# Patient Record
Sex: MALE | Race: WHITE | Employment: OTHER | URBAN - METROPOLITAN AREA
[De-identification: names, ages, dates, MRNs, and addresses within clinical notes are randomized per-mention and may not be internally consistent; named-entity substitution may affect disease eponyms.]

---

## 2018-10-29 PROBLEM — K40.90 RIGHT INGUINAL HERNIA: Status: ACTIVE | Noted: 2018-10-29

## 2018-12-07 RX ORDER — SODIUM CHLORIDE 0.9 % (FLUSH) 0.9 %
5-10 SYRINGE (ML) INJECTION EVERY 8 HOURS
Status: CANCELLED | OUTPATIENT
Start: 2018-12-07

## 2018-12-07 RX ORDER — SODIUM CHLORIDE 0.9 % (FLUSH) 0.9 %
5-10 SYRINGE (ML) INJECTION AS NEEDED
Status: CANCELLED | OUTPATIENT
Start: 2018-12-07

## 2019-01-07 RX ORDER — SODIUM CHLORIDE 0.9 % (FLUSH) 0.9 %
5-40 SYRINGE (ML) INJECTION EVERY 8 HOURS
Status: CANCELLED | OUTPATIENT
Start: 2019-01-07

## 2019-01-07 RX ORDER — SODIUM CHLORIDE 0.9 % (FLUSH) 0.9 %
5-40 SYRINGE (ML) INJECTION AS NEEDED
Status: CANCELLED | OUTPATIENT
Start: 2019-01-07

## 2019-01-21 ENCOUNTER — ANESTHESIA EVENT (OUTPATIENT)
Dept: SURGERY | Age: 69
End: 2019-01-21
Payer: COMMERCIAL

## 2019-01-22 ENCOUNTER — ANESTHESIA (OUTPATIENT)
Dept: SURGERY | Age: 69
End: 2019-01-22
Payer: COMMERCIAL

## 2019-01-22 ENCOUNTER — HOSPITAL ENCOUNTER (OUTPATIENT)
Age: 69
Setting detail: OUTPATIENT SURGERY
Discharge: HOME | End: 2019-01-22
Attending: SURGERY | Admitting: SURGERY
Payer: COMMERCIAL

## 2019-01-22 VITALS
RESPIRATION RATE: 12 BRPM | BODY MASS INDEX: 25.86 KG/M2 | HEART RATE: 80 BPM | TEMPERATURE: 97.9 F | HEIGHT: 70 IN | DIASTOLIC BLOOD PRESSURE: 73 MMHG | SYSTOLIC BLOOD PRESSURE: 118 MMHG | WEIGHT: 180.6 LBS | OXYGEN SATURATION: 92 %

## 2019-01-22 DIAGNOSIS — K40.90 RIGHT INGUINAL HERNIA: Primary | ICD-10-CM

## 2019-01-22 LAB — HGB BLD-MCNC: 13.5 G/DL (ref 13.6–17.2)

## 2019-01-22 PROCEDURE — 77030037088 HC TUBE ENDOTRACH ORAL NSL COVD-A: Performed by: ANESTHESIOLOGY

## 2019-01-22 PROCEDURE — 77030018673: Performed by: SURGERY

## 2019-01-22 PROCEDURE — 77030031139 HC SUT VCRL2 J&J -A: Performed by: SURGERY

## 2019-01-22 PROCEDURE — 74011250636 HC RX REV CODE- 250/636: Performed by: SURGERY

## 2019-01-22 PROCEDURE — 74011000250 HC RX REV CODE- 250

## 2019-01-22 PROCEDURE — 77030039425 HC BLD LARYNG TRULITE DISP TELE -A: Performed by: ANESTHESIOLOGY

## 2019-01-22 PROCEDURE — 76060000034 HC ANESTHESIA 1.5 TO 2 HR: Performed by: SURGERY

## 2019-01-22 PROCEDURE — 74011000272 HC RX REV CODE- 272: Performed by: SURGERY

## 2019-01-22 PROCEDURE — 77030003028 HC SUT VCRL J&J -A: Performed by: SURGERY

## 2019-01-22 PROCEDURE — 77030020782 HC GWN BAIR PAWS FLX 3M -B: Performed by: ANESTHESIOLOGY

## 2019-01-22 PROCEDURE — 74011250637 HC RX REV CODE- 250/637: Performed by: ANESTHESIOLOGY

## 2019-01-22 PROCEDURE — 77030018836 HC SOL IRR NACL ICUM -A: Performed by: SURGERY

## 2019-01-22 PROCEDURE — 76210000006 HC OR PH I REC 0.5 TO 1 HR: Performed by: SURGERY

## 2019-01-22 PROCEDURE — 74011250636 HC RX REV CODE- 250/636

## 2019-01-22 PROCEDURE — 85018 HEMOGLOBIN: CPT

## 2019-01-22 PROCEDURE — 74011250636 HC RX REV CODE- 250/636: Performed by: ANESTHESIOLOGY

## 2019-01-22 PROCEDURE — 77030039266 HC ADH SKN EXOFIN S2SG -A: Performed by: SURGERY

## 2019-01-22 PROCEDURE — 77030032490 HC SLV COMPR SCD KNE COVD -B: Performed by: SURGERY

## 2019-01-22 PROCEDURE — 76010000162 HC OR TIME 1.5 TO 2 HR INTENSV-TIER 1: Performed by: SURGERY

## 2019-01-22 PROCEDURE — 77030002986 HC SUT PROL J&J -A: Performed by: SURGERY

## 2019-01-22 PROCEDURE — 76210000020 HC REC RM PH II FIRST 0.5 HR: Performed by: SURGERY

## 2019-01-22 PROCEDURE — 74011000250 HC RX REV CODE- 250: Performed by: SURGERY

## 2019-01-22 PROCEDURE — 77030012411 HC DRN WND CARD -A: Performed by: SURGERY

## 2019-01-22 PROCEDURE — C1781 MESH (IMPLANTABLE): HCPCS | Performed by: SURGERY

## 2019-01-22 DEVICE — SELF-GRIPPING POLYESTER MESH,RIGHT ANATOMICAL, POLYESTER WITH POLYLACTIC ACID GRIPS
Type: IMPLANTABLE DEVICE | Site: INGUINAL | Status: FUNCTIONAL
Brand: PROGRIP

## 2019-01-22 RX ORDER — ROCURONIUM BROMIDE 10 MG/ML
INJECTION, SOLUTION INTRAVENOUS AS NEEDED
Status: DISCONTINUED | OUTPATIENT
Start: 2019-01-22 | End: 2019-01-22 | Stop reason: HOSPADM

## 2019-01-22 RX ORDER — PROPOFOL 10 MG/ML
INJECTION, EMULSION INTRAVENOUS AS NEEDED
Status: DISCONTINUED | OUTPATIENT
Start: 2019-01-22 | End: 2019-01-22 | Stop reason: HOSPADM

## 2019-01-22 RX ORDER — HYDROMORPHONE HYDROCHLORIDE 2 MG/ML
0.5 INJECTION, SOLUTION INTRAMUSCULAR; INTRAVENOUS; SUBCUTANEOUS
Status: DISCONTINUED | OUTPATIENT
Start: 2019-01-22 | End: 2019-01-22 | Stop reason: HOSPADM

## 2019-01-22 RX ORDER — LIDOCAINE HYDROCHLORIDE 10 MG/ML
0.1 INJECTION INFILTRATION; PERINEURAL AS NEEDED
Status: DISCONTINUED | OUTPATIENT
Start: 2019-01-22 | End: 2019-01-22 | Stop reason: HOSPADM

## 2019-01-22 RX ORDER — OXYCODONE HYDROCHLORIDE 5 MG/1
10 TABLET ORAL
Status: DISCONTINUED | OUTPATIENT
Start: 2019-01-22 | End: 2019-01-22 | Stop reason: HOSPADM

## 2019-01-22 RX ORDER — DEXAMETHASONE SODIUM PHOSPHATE 4 MG/ML
INJECTION, SOLUTION INTRA-ARTICULAR; INTRALESIONAL; INTRAMUSCULAR; INTRAVENOUS; SOFT TISSUE AS NEEDED
Status: DISCONTINUED | OUTPATIENT
Start: 2019-01-22 | End: 2019-01-22 | Stop reason: HOSPADM

## 2019-01-22 RX ORDER — HYDROCODONE BITARTRATE AND ACETAMINOPHEN 5; 325 MG/1; MG/1
1-2 TABLET ORAL
Qty: 40 TAB | Refills: 0 | Status: SHIPPED | OUTPATIENT
Start: 2019-01-22 | End: 2020-02-27 | Stop reason: CLARIF

## 2019-01-22 RX ORDER — EPHEDRINE SULFATE 50 MG/ML
INJECTION, SOLUTION INTRAVENOUS AS NEEDED
Status: DISCONTINUED | OUTPATIENT
Start: 2019-01-22 | End: 2019-01-22 | Stop reason: HOSPADM

## 2019-01-22 RX ORDER — BUPIVACAINE HYDROCHLORIDE AND EPINEPHRINE 5; 5 MG/ML; UG/ML
INJECTION, SOLUTION EPIDURAL; INTRACAUDAL; PERINEURAL AS NEEDED
Status: DISCONTINUED | OUTPATIENT
Start: 2019-01-22 | End: 2019-01-22 | Stop reason: HOSPADM

## 2019-01-22 RX ORDER — NALOXONE HYDROCHLORIDE 0.4 MG/ML
0.1 INJECTION, SOLUTION INTRAMUSCULAR; INTRAVENOUS; SUBCUTANEOUS AS NEEDED
Status: DISCONTINUED | OUTPATIENT
Start: 2019-01-22 | End: 2019-01-22 | Stop reason: HOSPADM

## 2019-01-22 RX ORDER — MIDAZOLAM HYDROCHLORIDE 1 MG/ML
2 INJECTION, SOLUTION INTRAMUSCULAR; INTRAVENOUS
Status: DISCONTINUED | OUTPATIENT
Start: 2019-01-22 | End: 2019-01-22 | Stop reason: HOSPADM

## 2019-01-22 RX ORDER — KETOROLAC TROMETHAMINE 30 MG/ML
INJECTION, SOLUTION INTRAMUSCULAR; INTRAVENOUS AS NEEDED
Status: DISCONTINUED | OUTPATIENT
Start: 2019-01-22 | End: 2019-01-22 | Stop reason: HOSPADM

## 2019-01-22 RX ORDER — FENTANYL CITRATE 50 UG/ML
100 INJECTION, SOLUTION INTRAMUSCULAR; INTRAVENOUS ONCE
Status: DISCONTINUED | OUTPATIENT
Start: 2019-01-22 | End: 2019-01-22 | Stop reason: HOSPADM

## 2019-01-22 RX ORDER — DOCUSATE SODIUM 100 MG/1
100 CAPSULE, LIQUID FILLED ORAL 2 TIMES DAILY
Qty: 60 CAP | Refills: 0 | Status: SHIPPED | OUTPATIENT
Start: 2019-01-22 | End: 2019-02-21

## 2019-01-22 RX ORDER — SODIUM CHLORIDE, SODIUM LACTATE, POTASSIUM CHLORIDE, CALCIUM CHLORIDE 600; 310; 30; 20 MG/100ML; MG/100ML; MG/100ML; MG/100ML
100 INJECTION, SOLUTION INTRAVENOUS CONTINUOUS
Status: DISCONTINUED | OUTPATIENT
Start: 2019-01-22 | End: 2019-01-22 | Stop reason: HOSPADM

## 2019-01-22 RX ORDER — MIDAZOLAM HYDROCHLORIDE 1 MG/ML
2 INJECTION, SOLUTION INTRAMUSCULAR; INTRAVENOUS ONCE
Status: DISCONTINUED | OUTPATIENT
Start: 2019-01-22 | End: 2019-01-22 | Stop reason: HOSPADM

## 2019-01-22 RX ORDER — LIDOCAINE HYDROCHLORIDE 20 MG/ML
INJECTION, SOLUTION EPIDURAL; INFILTRATION; INTRACAUDAL; PERINEURAL AS NEEDED
Status: DISCONTINUED | OUTPATIENT
Start: 2019-01-22 | End: 2019-01-22 | Stop reason: HOSPADM

## 2019-01-22 RX ORDER — ONDANSETRON 2 MG/ML
INJECTION INTRAMUSCULAR; INTRAVENOUS AS NEEDED
Status: DISCONTINUED | OUTPATIENT
Start: 2019-01-22 | End: 2019-01-22 | Stop reason: HOSPADM

## 2019-01-22 RX ORDER — DIPHENHYDRAMINE HYDROCHLORIDE 50 MG/ML
12.5 INJECTION, SOLUTION INTRAMUSCULAR; INTRAVENOUS
Status: DISCONTINUED | OUTPATIENT
Start: 2019-01-22 | End: 2019-01-22 | Stop reason: HOSPADM

## 2019-01-22 RX ORDER — GLYCOPYRROLATE 0.2 MG/ML
INJECTION INTRAMUSCULAR; INTRAVENOUS AS NEEDED
Status: DISCONTINUED | OUTPATIENT
Start: 2019-01-22 | End: 2019-01-22 | Stop reason: HOSPADM

## 2019-01-22 RX ORDER — NEOSTIGMINE METHYLSULFATE 1 MG/ML
INJECTION INTRAVENOUS AS NEEDED
Status: DISCONTINUED | OUTPATIENT
Start: 2019-01-22 | End: 2019-01-22 | Stop reason: HOSPADM

## 2019-01-22 RX ORDER — ONDANSETRON 2 MG/ML
4 INJECTION INTRAMUSCULAR; INTRAVENOUS ONCE
Status: DISCONTINUED | OUTPATIENT
Start: 2019-01-22 | End: 2019-01-22 | Stop reason: HOSPADM

## 2019-01-22 RX ORDER — CEFAZOLIN SODIUM/WATER 2 G/20 ML
2 SYRINGE (ML) INTRAVENOUS ONCE
Status: COMPLETED | OUTPATIENT
Start: 2019-01-22 | End: 2019-01-22

## 2019-01-22 RX ORDER — ALBUTEROL SULFATE 0.83 MG/ML
2.5 SOLUTION RESPIRATORY (INHALATION) AS NEEDED
Status: DISCONTINUED | OUTPATIENT
Start: 2019-01-22 | End: 2019-01-22 | Stop reason: HOSPADM

## 2019-01-22 RX ORDER — SODIUM CHLORIDE 0.9 % (FLUSH) 0.9 %
5-40 SYRINGE (ML) INJECTION AS NEEDED
Status: DISCONTINUED | OUTPATIENT
Start: 2019-01-22 | End: 2019-01-22 | Stop reason: HOSPADM

## 2019-01-22 RX ORDER — SODIUM CHLORIDE 0.9 % (FLUSH) 0.9 %
5-40 SYRINGE (ML) INJECTION EVERY 8 HOURS
Status: DISCONTINUED | OUTPATIENT
Start: 2019-01-22 | End: 2019-01-22 | Stop reason: HOSPADM

## 2019-01-22 RX ORDER — OXYCODONE HYDROCHLORIDE 5 MG/1
5 TABLET ORAL
Status: COMPLETED | OUTPATIENT
Start: 2019-01-22 | End: 2019-01-22

## 2019-01-22 RX ORDER — FENTANYL CITRATE 50 UG/ML
INJECTION, SOLUTION INTRAMUSCULAR; INTRAVENOUS AS NEEDED
Status: DISCONTINUED | OUTPATIENT
Start: 2019-01-22 | End: 2019-01-22 | Stop reason: HOSPADM

## 2019-01-22 RX ADMIN — GLYCOPYRROLATE 0.4 MG: 0.2 INJECTION INTRAMUSCULAR; INTRAVENOUS at 08:42

## 2019-01-22 RX ADMIN — PROPOFOL 150 MG: 10 INJECTION, EMULSION INTRAVENOUS at 07:19

## 2019-01-22 RX ADMIN — HYDROMORPHONE HYDROCHLORIDE 0.5 MG: 2 INJECTION, SOLUTION INTRAMUSCULAR; INTRAVENOUS; SUBCUTANEOUS at 09:03

## 2019-01-22 RX ADMIN — NEOSTIGMINE METHYLSULFATE 3 MG: 1 INJECTION INTRAVENOUS at 08:42

## 2019-01-22 RX ADMIN — ROCURONIUM BROMIDE 50 MG: 10 INJECTION, SOLUTION INTRAVENOUS at 07:19

## 2019-01-22 RX ADMIN — DEXAMETHASONE SODIUM PHOSPHATE 4 MG: 4 INJECTION, SOLUTION INTRA-ARTICULAR; INTRALESIONAL; INTRAMUSCULAR; INTRAVENOUS; SOFT TISSUE at 08:36

## 2019-01-22 RX ADMIN — KETOROLAC TROMETHAMINE 30 MG: 30 INJECTION, SOLUTION INTRAMUSCULAR; INTRAVENOUS at 08:36

## 2019-01-22 RX ADMIN — LIDOCAINE HYDROCHLORIDE 100 MG: 20 INJECTION, SOLUTION EPIDURAL; INFILTRATION; INTRACAUDAL; PERINEURAL at 07:19

## 2019-01-22 RX ADMIN — Medication 2 G: at 07:22

## 2019-01-22 RX ADMIN — OXYCODONE HYDROCHLORIDE 5 MG: 5 TABLET ORAL at 09:27

## 2019-01-22 RX ADMIN — SODIUM CHLORIDE, SODIUM LACTATE, POTASSIUM CHLORIDE, AND CALCIUM CHLORIDE 100 ML/HR: 600; 310; 30; 20 INJECTION, SOLUTION INTRAVENOUS at 05:57

## 2019-01-22 RX ADMIN — FENTANYL CITRATE 100 MCG: 50 INJECTION, SOLUTION INTRAMUSCULAR; INTRAVENOUS at 07:19

## 2019-01-22 RX ADMIN — EPHEDRINE SULFATE 5 MG: 50 INJECTION, SOLUTION INTRAVENOUS at 08:20

## 2019-01-22 RX ADMIN — ONDANSETRON 4 MG: 2 INJECTION INTRAMUSCULAR; INTRAVENOUS at 08:36

## 2019-01-22 RX ADMIN — EPHEDRINE SULFATE 5 MG: 50 INJECTION, SOLUTION INTRAVENOUS at 08:17

## 2019-01-22 NOTE — BRIEF OP NOTE
BRIEF OPERATIVE NOTE    Date of Procedure: 1/22/2019   Preoperative Diagnosis: Initial inguinal hernia without obstruction or gangrene  Postoperative Diagnosis: Initial Right sliding inguinal hernia without gangrene  Procedure(s): HERNIA SLIDING INGUINAL REPAIR RIGHT WITH PROGRIP MESH  Surgeon(s) and Role:     * Shannon Vanegas MD - Primary         Surgical Assistant: none    Surgical Staff:  Circ-1: Joaquim Parmar RN  Scrub Tech-1: Sheryl Smith  Scrub Tech-2: Misa Braswell  Scrub Tech-3: Alma Shetty  Event Time In Time Out   Incision Start 8152    Incision Close 0849      Anesthesia: General   Estimated Blood Loss: <20 cc  Specimens: * No specimens in log *   Findings: much scarring at inguinal canal c/w chronic hernia, weak direct floor but no direct hernia, large loss of lateral floor at internal ring, sac in cord c/w congenital hernia but larest herniation of viscus c/w R colon, high dissection and return to abdominal cavity and internal ring closed to keep contents contained during mesh incorporation, repair of floor with R side Progrip mesh with full David fixation. Nerves crossing canal excised to prevent entrapment. Complications: none apparent  Implants:   Implant Name Type Inv.  Item Serial No.  Lot No. LRB No. Used Action   MESH POLYSTR SLF- 12X8 RT -- PROGRIP - [id number]  MESH POLYSTR SLF- 12X8 RT -- [address] [id number] Right 1 Implanted     Deo Haynes MD

## 2019-01-22 NOTE — ANESTHESIA POSTPROCEDURE EVALUATION
Procedure(s): HERNIA INGUINAL REPAIR RIGHT WITH PROGRIP MESH. Anesthesia Post Evaluation Multimodal analgesia: multimodal analgesia used between 6 hours prior to anesthesia start to PACU discharge Patient location during evaluation: PACU Patient participation: complete - patient participated Level of consciousness: awake and awake and alert Pain management: adequate Airway patency: patent Anesthetic complications: no 
Cardiovascular status: acceptable Respiratory status: acceptable Hydration status: acceptable Visit Vitals /70 (BP 1 Location: Right arm, BP Patient Position: At rest;Supine; Head of bed elevated (Comment degrees)) Pulse 75 Temp 36.6 °C (97.9 °F) Resp 16 Ht 5' 10\" (1.778 m) Wt 81.9 kg (180 lb 9.6 oz) SpO2 96% BMI 25.91 kg/m²

## 2019-01-22 NOTE — DISCHARGE INSTRUCTIONS
Leave dressing for 4 days, then remove. Leave glue dressing intact until seen in follow up. OK to shower tomorrow but do not spray water directly into wound. No heavy lifting (>10lbs) for 6 weeks to reduce risk of disrupting hernia repair. May resume normal activity including walking, which is encouraged to reduce risk of blood clots. No driving until you are completely off pain meds for 24hrs and have no pain with movements associated with driving. Pain prescription on chart for patient to use as needed for pain   Colace (stool softener) to help prevent straining at stool. If constipation occurs, then treat with Milk of Magnesia. DO NOT STRAIN. Follow-up with Dr Iliana Jenkins in 2 weeks ([id number])     After general anesthesia or intravenous sedation, for 24 hours or while taking prescription Narcotics:  · Limit your activities  · Do not drive and operate hazardous machinery  · Do not make important personal or business decisions  · Do  not drink alcoholic beverages  · If you have not urinated within 8 hours after discharge, please contact your surgeon on call. *  Please give a list of your current medications to your Primary Care Provider. *  Please update this list whenever your medications are discontinued, doses are      changed, or new medications (including over-the-counter products) are added. *  Please carry medication information at all times in case of emergency situations. These are general instructions for a healthy lifestyle:  No smoking/ No tobacco products/ Avoid exposure to second hand smoke  Surgeon General's Warning:  Quitting smoking now greatly reduces serious risk to your health.   Obesity, smoking, and sedentary lifestyle greatly increases your risk for illness  A healthy diet, regular physical exercise & weight monitoring are important for maintaining a healthy lifestyle    You may be retaining fluid if you have a history of heart failure or if you experience any of the following symptoms:  Weight gain of 3 pounds or more overnight or 5 pounds in a week, increased swelling in our hands or feet or shortness of breath while lying flat in bed. Please call your doctor as soon as you notice any of these symptoms; do not wait until your next office visit. Recognize signs and symptoms of STROKE:  F-face looks uneven  A-arms unable to move or move unevenly  S-speech slurred or non-existent  T-time-call 911 as soon as signs and symptoms begin-DO NOT go       Back to bed or wait to see if you get better-TIME IS BRAIN.

## 2019-01-22 NOTE — ANESTHESIA PREPROCEDURE EVALUATION
Anesthetic History Review of Systems / Medical History Patient summary reviewed, nursing notes reviewed and pertinent labs reviewed Pulmonary Neuro/Psych Cardiovascular Hypertension: well controlled Exercise tolerance: >4 METS 
  
GI/Hepatic/Renal 
  
GERD: well controlled Endo/Other Other Findings Physical Exam 
 
Airway Mallampati: II 
TM Distance: 4 - 6 cm Neck ROM: normal range of motion Mouth opening: Normal 
 
 Cardiovascular Regular rate and rhythm,  S1 and S2 normal,  no murmur, click, rub, or gallop Dental 
No notable dental hx Pulmonary Breath sounds clear to auscultation Abdominal 
 
 
 
 Other Findings Anesthetic Plan ASA: 2 Anesthesia type: general 
 
 
 
 
Induction: Intravenous Anesthetic plan and risks discussed with: Patient

## 2019-01-22 NOTE — H&P
[facility] [address]  [phone]    H&P Note   Eva Wolf   MRN: [id number]     : 1950        HPI: Eva Wolf is a 70 y.o. male who is referred by Dr. Kalpesh Thornton for right inguinal hernia. The patient has noticed a bulge in his right groin for approximately 1 year. He brought to the attention of Dr. True Sepulveda who follows him for BPH. He was found to have a reducible right inguinal hernia and no evidence of left inguinal hernia. His BPH symptoms are controlled with Flomax. He does note increase in symptoms when he misses doses. He has not had any prostate surgery. He denies any prior hernia surgeries. He denies any bulging on the left side. He is otherwise in good health. Past Medical History:   Diagnosis Date    Arthritis     feet and hands    Enlarged prostate     GERD (gastroesophageal reflux disease)     controlled with nexium    Hypercholesterolemia     Hypertension     Kidney stone     Right inguinal hernia      Past Surgical History:   Procedure Laterality Date    HX CERVICAL LAMINECTOMY      HX TONSILLECTOMY      x2     No current facility-administered medications for this encounter. Current Outpatient Medications   Medication Sig    multivitamin (ONE A DAY) tablet Take 1 Tab by mouth daily.  tamsulosin (FLOMAX) 0.4 mg capsule TAKE 1 CAPSULE NIGHTLY    losartan (COZAAR) 50 mg tablet TAKE 1 TABLET DAILY in the am    atorvastatin (LIPITOR) 10 mg tablet 10 mg every morning.  levocetirizine (XYZAL) 5 mg tablet     esomeprazole (NEXIUM) 40 mg capsule Take 40 mg by mouth every morning. ALLERGIES:  Patient has no known allergies.     Social History     Socioeconomic History    Marital status:      Spouse name: Not on file    Number of children: Not on file    Years of education: Not on file    Highest education level: Not on file   Tobacco Use    Smoking status: Former Smoker Packs/day: 1.00     Years: 6.00     Pack years: 6.00     Last attempt to quit: 1973     Years since quittin.0    Smokeless tobacco: Never Used   Substance and Sexual Activity    Alcohol use: Yes     Alcohol/week: 1.8 oz     Types: 3 Glasses of wine per week    Drug use: No    Sexual activity: Yes     Partners: Female     Social History     Tobacco Use   Smoking Status Former Smoker    Packs/day: 1.00    Years: 6.00    Pack years: 6.00    Last attempt to quit: 1973    Years since quittin.0   Smokeless Tobacco Never Used     Family History   Problem Relation Age of Onset    Hypertension Mother     Cancer Father     Cancer Sister        ROS: The patient has no difficulty with chest pain or shortness of breath. No fever or chills. The patient denies any personal or family history of abnormal clotting or bleeding. Comprehensive review of systems was otherwise unremarkable except as noted above. Physical Exam:   Constitutional: Alert oriented cooperative patient in no acute distress. Visit Vitals  /90 (BP 1 Location: Left arm, BP Patient Position: Sitting)   Pulse 84   Resp 19   Ht 5' 9\" (1.753 m)   Wt 176 lb 6.4 oz (80 kg)   SpO2 94%   BMI 26.05 kg/m²       Eyes:Sclera are clear without icterus. ENMT: no obvious neck masses, no ear or lip lesions  CV: RRR. Normal perfusion  Resp: No JVD. Breathing is  non-labored. GI: soft and non-distended     : Normal male external genitalia, obvious bulge on right side, no visible bulge on left side. Examination in standing and supine positions with straining reveal normal testes bilaterally, no evidence of bulging on the left side, clear bulge consistent with inguinal hernia on the right side with easy reduction in supine position  Musculoskeletal: unremarkable with normal function.    Neuro:  No obvious focal deficits  Psychiatric: normal affect and mood, no memory impairment    No results found for: WBC, WBCLT, HGB, HGBP, HCT, PHCT, RBCH, PLT, MCV, HGBEXT, HCTEXT, PLTEXT, HGBEXT, HCTEXT, PLTEXT    No results found for: NA, K, CL, CO2, BUN, CREA, GLU, INR, APTT, TBIL, TBILI, SGOT, GPT, ALT, AP, AML, LPSE    Assessment/Plan:     Saul Corea is a 70 y.o. male who has an initial right inguinal hernia. There is no evidence of left inguinal hernia. He has no prior repairs. He does have symptomatic BPH which is controlled with Flomax. We discussed different types of repairs and discussed proceeding with an open right inguinal hernia repair with mesh. I discussed the patient's condition and treatment options with the patient. I discussed risks of surgery in language the patient could understand including bleeding, infection, recurrence, chronic numbness, chronic pain, injury to cord structures, injury to testicles, urinary retention, need for further surgery, abscess, fistula, SBO, DVT, PE, heart attack, stroke, renal failure, respiratory failure, ventilatory dependence, and death. The patient voiced understanding of all this and all questions were answered. Alternatives to surgery were discussed also and risks of the alternatives. The patient requested that we proceed with surgery. Informed consent was obtained.      Problem List  Date Reviewed: 11/5/2018          Codes Class Noted    Right inguinal hernia ICD-10-CM: K40.90  ICD-9-CM: 550.90  10/29/2018        BPH (benign prostatic hyperplasia) ICD-10-CM: N40.0  ICD-9-CM: 600.00  7/22/2015        Urinary frequency ICD-10-CM: R35.0  ICD-9-CM: 788.41  7/22/2015                Sasha Miles MD,  FACS

## 2019-01-22 NOTE — OP NOTES
[facility] [address], [address] [facility]  [phone]    OPERATVE REPORT    Name: Pito Rai     Date of Surgery: 1/22/2019  Med Record Number: [id number]   Age: 70 y.o. Sex: male   Preoperative Diagnosis: Initial inguinal hernia without obstruction or gangrene  Postoperative Diagnosis: Initial Right sliding inguinal hernia without gangrene  Procedure(s): HERNIA SLIDING INGUINAL REPAIR RIGHT WITH PROGRIP MESH  Surgeon(s) and Role:     * Charlette Roberts MD - Primary         Surgical Assistant: none     Surgical Staff:  Circ-1: Charles Larios RN  Scrub Tech-1: Sheryl Smith  Scrub Tech-2: Barry Galindo  Scrub Tech-3: Lorraine Steward  Event Time In Time Out   Incision Start 0869     Incision Close 0849        Anesthesia: General   Estimated Blood Loss: <20 cc  Specimens: * No specimens in log *   Findings: much scarring at inguinal canal c/w chronic hernia, weak direct floor but no direct hernia, large loss of lateral floor at internal ring, sac in cord c/w congenital hernia but larest herniation of viscus c/w R colon, high dissection and return to abdominal cavity and internal ring closed to keep contents contained during mesh incorporation, repair of floor with R side Progrip mesh with full David fixation. Nerves crossing canal excised to prevent entrapment. Complications: none apparent  Implants:   Implant Name Type Inv. Item Serial No.  Lot No. LRB No. Used Action   MESH POLYSTR SLF- 12X8 RT -- PROGRIP - [id number]   MESH POLYSTR SLF- 12X8 RT -- PROGRIP   [employer]  [employer]URGICAL [id number] Right 1 Implanted      Procedure Description:   The risks, benefits, potential complications, treatment options, and expected outcomes were discussed with the patient pre-operatively. The patient voiced understanding and gave informed consent preoperatively.   The patient was taken to the Operating Room, and the [facility] time-out protocol checklist was followed. After the induction of adequate anesthesia, the abdomen was prepped and draped in the usual sterile fashion. An Annmarie Poke was used to help prevent contact with the skin. Preoperative antibiotics were given. A skinline inguinal incision was made and carried to the external oblique fascia and external ring. The external oblique was opened in the direction of its fibers down to the ring and the thickened spermatic cord was encircled with a penrose drain. The investing cremasteric fibers of the cord were opened to explore the cord. The cord was disrupted by a large sliding hernia defect. The indirect sliding hernia sac was tediously dissected free from the spermatic cord taking great care not to injure the cord structures. The hernia sac was not opened. The sac was a viscus (most likely cecum) and after high dissection, the sac was returned to the preperitioneal space and the internal ring was imbricated with 2-0 vicryl to tighten the ring and buttress to the mesh repair during healing. A superior canal nerve course would have been entrapped and neurectomy peformed from exit to muscle to exit from canal.  The wound was irrigated with Ancef irrigation. A piece of R side Progrip mesh was placed over the inguinal floor using the self adhering feature for placement and then anchored medial to the pubic tubercle with 2 x 0 prolene sutures. The mesh was further secured along the iliopubic tract with running 0-prolene suture to limit migration. The flap in the mesh to allow accommodation of the cord was secured with 0 prolene suture superomedially. The wound was once again irrigated with Ancef and hemostasis confirmed. It was then anesthetized with marcaine and the external oblique was re approximated with 2-0 vicryl suture. Alyssa's fascia was closed with 3-0 vicryl suture. The skin was closed with subcuticular 4-0 vicryl and Exofin. A sterile dressing of Telfa and tegaderm was placed.    At the end of the operation, all sponge, instruments, and needle counts were correct. The patient was taken to PACU in good condition.     Kimberly Serna MD, FACS

## 2020-01-18 ENCOUNTER — HOSPITAL ENCOUNTER (OUTPATIENT)
Dept: CT IMAGING | Age: 70
Discharge: HOME | End: 2020-01-18
Attending: UROLOGY
Payer: MEDICARE

## 2020-01-18 DIAGNOSIS — R31.29 MICROSCOPIC HEMATURIA: ICD-10-CM

## 2020-01-18 DIAGNOSIS — N40.0 BPH WITHOUT OBSTRUCTION/LOWER URINARY TRACT SYMPTOMS: ICD-10-CM

## 2020-01-18 PROCEDURE — 74176 CT ABD & PELVIS W/O CONTRAST: CPT

## 2020-02-25 PROBLEM — N20.0 BILATERAL RENAL STONES: Status: ACTIVE | Noted: 2020-02-25

## 2020-02-25 PROBLEM — C67.2 MALIGNANT NEOPLASM OF LATERAL WALL OF URINARY BLADDER (HCC): Status: ACTIVE | Noted: 2020-02-25

## 2020-02-25 PROBLEM — N21.0 BLADDER CALCULI: Status: ACTIVE | Noted: 2020-02-25

## 2020-03-01 ENCOUNTER — ANESTHESIA EVENT (OUTPATIENT)
Dept: SURGERY | Age: 70
End: 2020-03-01
Payer: MEDICARE

## 2020-03-02 ENCOUNTER — HOSPITAL ENCOUNTER (OUTPATIENT)
Age: 70
Discharge: HOME | End: 2020-03-05
Attending: UROLOGY | Admitting: UROLOGY
Payer: MEDICARE

## 2020-03-02 ENCOUNTER — ANESTHESIA (OUTPATIENT)
Dept: SURGERY | Age: 70
End: 2020-03-02
Payer: MEDICARE

## 2020-03-02 DIAGNOSIS — C67.9 MALIGNANT NEOPLASM OF URINARY BLADDER, UNSPECIFIED SITE (HCC): Primary | ICD-10-CM

## 2020-03-02 PROCEDURE — 77030040361 HC SLV COMPR DVT MDII -B: Performed by: UROLOGY

## 2020-03-02 PROCEDURE — 77030018836 HC SOL IRR NACL ICUM -A: Performed by: UROLOGY

## 2020-03-02 PROCEDURE — 65270000029 HC RM PRIVATE

## 2020-03-02 PROCEDURE — 77030005546 HC CATH URETH FOL 3W BARD -A: Performed by: UROLOGY

## 2020-03-02 PROCEDURE — 74011000250 HC RX REV CODE- 250: Performed by: NURSE ANESTHETIST, CERTIFIED REGISTERED

## 2020-03-02 PROCEDURE — 74011250636 HC RX REV CODE- 250/636: Performed by: NURSE ANESTHETIST, CERTIFIED REGISTERED

## 2020-03-02 PROCEDURE — 77030010509 HC AIRWY LMA MSK TELE -A: Performed by: ANESTHESIOLOGY

## 2020-03-02 PROCEDURE — 77030019927 HC TBNG IRR CYSTO BAXT -A: Performed by: UROLOGY

## 2020-03-02 PROCEDURE — 74011250636 HC RX REV CODE- 250/636: Performed by: UROLOGY

## 2020-03-02 PROCEDURE — 74011250637 HC RX REV CODE- 250/637: Performed by: UROLOGY

## 2020-03-02 PROCEDURE — 77030029290 HC ELECTRD LP CUT OCOA -F: Performed by: UROLOGY

## 2020-03-02 PROCEDURE — 76210000006 HC OR PH I REC 0.5 TO 1 HR: Performed by: UROLOGY

## 2020-03-02 PROCEDURE — 76060000033 HC ANESTHESIA 1 TO 1.5 HR: Performed by: UROLOGY

## 2020-03-02 PROCEDURE — 88305 TISSUE EXAM BY PATHOLOGIST: CPT

## 2020-03-02 PROCEDURE — 74011250636 HC RX REV CODE- 250/636: Performed by: ANESTHESIOLOGY

## 2020-03-02 PROCEDURE — 77030018836 HC SOL IRR NACL ICUM -A

## 2020-03-02 PROCEDURE — 77030033648: Performed by: UROLOGY

## 2020-03-02 PROCEDURE — 74011250637 HC RX REV CODE- 250/637: Performed by: ANESTHESIOLOGY

## 2020-03-02 PROCEDURE — 76010000161 HC OR TIME 1 TO 1.5 HR INTENSV-TIER 1: Performed by: UROLOGY

## 2020-03-02 RX ORDER — ATROPA BELLADONNA AND OPIUM 16.2; 6 MG/1; MG/1
SUPPOSITORY RECTAL AS NEEDED
Status: DISCONTINUED | OUTPATIENT
Start: 2020-03-02 | End: 2020-03-02 | Stop reason: HOSPADM

## 2020-03-02 RX ORDER — SODIUM CHLORIDE 0.9 % (FLUSH) 0.9 %
5-40 SYRINGE (ML) INJECTION AS NEEDED
Status: DISCONTINUED | OUTPATIENT
Start: 2020-03-02 | End: 2020-03-05 | Stop reason: HOSPADM

## 2020-03-02 RX ORDER — DEXTROSE, SODIUM CHLORIDE, SODIUM LACTATE, POTASSIUM CHLORIDE, AND CALCIUM CHLORIDE 5; .6; .31; .03; .02 G/100ML; G/100ML; G/100ML; G/100ML; G/100ML
50 INJECTION, SOLUTION INTRAVENOUS CONTINUOUS
Status: DISCONTINUED | OUTPATIENT
Start: 2020-03-02 | End: 2020-03-04

## 2020-03-02 RX ORDER — ATROPA BELLADONNA AND OPIUM 16.2; 6 MG/1; MG/1
1 SUPPOSITORY RECTAL
Status: DISCONTINUED | OUTPATIENT
Start: 2020-03-02 | End: 2020-03-05 | Stop reason: HOSPADM

## 2020-03-02 RX ORDER — MORPHINE SULFATE 2 MG/ML
2 INJECTION, SOLUTION INTRAMUSCULAR; INTRAVENOUS
Status: DISCONTINUED | OUTPATIENT
Start: 2020-03-02 | End: 2020-03-05 | Stop reason: HOSPADM

## 2020-03-02 RX ORDER — DIPHENHYDRAMINE HCL 25 MG
25 CAPSULE ORAL
Status: DISCONTINUED | OUTPATIENT
Start: 2020-03-02 | End: 2020-03-05 | Stop reason: HOSPADM

## 2020-03-02 RX ORDER — ONDANSETRON 2 MG/ML
4 INJECTION INTRAMUSCULAR; INTRAVENOUS
Status: DISCONTINUED | OUTPATIENT
Start: 2020-03-02 | End: 2020-03-02 | Stop reason: HOSPADM

## 2020-03-02 RX ORDER — SODIUM CHLORIDE, SODIUM LACTATE, POTASSIUM CHLORIDE, CALCIUM CHLORIDE 600; 310; 30; 20 MG/100ML; MG/100ML; MG/100ML; MG/100ML
1000 INJECTION, SOLUTION INTRAVENOUS CONTINUOUS
Status: DISCONTINUED | OUTPATIENT
Start: 2020-03-02 | End: 2020-03-02 | Stop reason: HOSPADM

## 2020-03-02 RX ORDER — PANTOPRAZOLE SODIUM 40 MG/1
40 TABLET, DELAYED RELEASE ORAL
Status: DISCONTINUED | OUTPATIENT
Start: 2020-03-03 | End: 2020-03-05 | Stop reason: HOSPADM

## 2020-03-02 RX ORDER — OXYCODONE AND ACETAMINOPHEN 5; 325 MG/1; MG/1
1 TABLET ORAL
Status: DISCONTINUED | OUTPATIENT
Start: 2020-03-02 | End: 2020-03-05 | Stop reason: HOSPADM

## 2020-03-02 RX ORDER — NALOXONE HYDROCHLORIDE 0.4 MG/ML
0.4 INJECTION, SOLUTION INTRAMUSCULAR; INTRAVENOUS; SUBCUTANEOUS AS NEEDED
Status: DISCONTINUED | OUTPATIENT
Start: 2020-03-02 | End: 2020-03-05 | Stop reason: HOSPADM

## 2020-03-02 RX ORDER — PROPOFOL 10 MG/ML
INJECTION, EMULSION INTRAVENOUS AS NEEDED
Status: DISCONTINUED | OUTPATIENT
Start: 2020-03-02 | End: 2020-03-02 | Stop reason: HOSPADM

## 2020-03-02 RX ORDER — DEXAMETHASONE SODIUM PHOSPHATE 4 MG/ML
INJECTION, SOLUTION INTRA-ARTICULAR; INTRALESIONAL; INTRAMUSCULAR; INTRAVENOUS; SOFT TISSUE AS NEEDED
Status: DISCONTINUED | OUTPATIENT
Start: 2020-03-02 | End: 2020-03-02 | Stop reason: HOSPADM

## 2020-03-02 RX ORDER — ALBUTEROL SULFATE 0.83 MG/ML
2.5 SOLUTION RESPIRATORY (INHALATION) AS NEEDED
Status: DISCONTINUED | OUTPATIENT
Start: 2020-03-02 | End: 2020-03-02 | Stop reason: HOSPADM

## 2020-03-02 RX ORDER — TAMSULOSIN HYDROCHLORIDE 0.4 MG/1
0.4 CAPSULE ORAL DAILY
Status: DISCONTINUED | OUTPATIENT
Start: 2020-03-03 | End: 2020-03-05 | Stop reason: HOSPADM

## 2020-03-02 RX ORDER — ONDANSETRON 2 MG/ML
INJECTION INTRAMUSCULAR; INTRAVENOUS AS NEEDED
Status: DISCONTINUED | OUTPATIENT
Start: 2020-03-02 | End: 2020-03-02 | Stop reason: HOSPADM

## 2020-03-02 RX ORDER — OXYCODONE HYDROCHLORIDE 5 MG/1
5 TABLET ORAL
Status: DISCONTINUED | OUTPATIENT
Start: 2020-03-02 | End: 2020-03-02 | Stop reason: HOSPADM

## 2020-03-02 RX ORDER — ATROPA BELLADONNA AND OPIUM 16.2; 6 MG/1; MG/1
1 SUPPOSITORY RECTAL ONCE
Status: DISCONTINUED | OUTPATIENT
Start: 2020-03-02 | End: 2020-03-02

## 2020-03-02 RX ORDER — FENTANYL CITRATE 50 UG/ML
INJECTION, SOLUTION INTRAMUSCULAR; INTRAVENOUS AS NEEDED
Status: DISCONTINUED | OUTPATIENT
Start: 2020-03-02 | End: 2020-03-02 | Stop reason: HOSPADM

## 2020-03-02 RX ORDER — SODIUM CHLORIDE 0.9 % (FLUSH) 0.9 %
5-40 SYRINGE (ML) INJECTION EVERY 8 HOURS
Status: DISCONTINUED | OUTPATIENT
Start: 2020-03-02 | End: 2020-03-05 | Stop reason: HOSPADM

## 2020-03-02 RX ORDER — ROSUVASTATIN CALCIUM 20 MG/1
40 TABLET, COATED ORAL
Status: DISCONTINUED | OUTPATIENT
Start: 2020-03-02 | End: 2020-03-05 | Stop reason: HOSPADM

## 2020-03-02 RX ORDER — HYDROMORPHONE HYDROCHLORIDE 2 MG/ML
0.5 INJECTION, SOLUTION INTRAMUSCULAR; INTRAVENOUS; SUBCUTANEOUS
Status: DISCONTINUED | OUTPATIENT
Start: 2020-03-02 | End: 2020-03-02 | Stop reason: HOSPADM

## 2020-03-02 RX ORDER — ACETAMINOPHEN 325 MG/1
650 TABLET ORAL
Status: DISCONTINUED | OUTPATIENT
Start: 2020-03-02 | End: 2020-03-05 | Stop reason: HOSPADM

## 2020-03-02 RX ORDER — LIDOCAINE HYDROCHLORIDE 20 MG/ML
INJECTION, SOLUTION EPIDURAL; INFILTRATION; INTRACAUDAL; PERINEURAL AS NEEDED
Status: DISCONTINUED | OUTPATIENT
Start: 2020-03-02 | End: 2020-03-02 | Stop reason: HOSPADM

## 2020-03-02 RX ORDER — LIDOCAINE HYDROCHLORIDE 10 MG/ML
0.1 INJECTION INFILTRATION; PERINEURAL AS NEEDED
Status: DISCONTINUED | OUTPATIENT
Start: 2020-03-02 | End: 2020-03-02 | Stop reason: HOSPADM

## 2020-03-02 RX ORDER — MIDAZOLAM HYDROCHLORIDE 1 MG/ML
2 INJECTION, SOLUTION INTRAMUSCULAR; INTRAVENOUS
Status: DISCONTINUED | OUTPATIENT
Start: 2020-03-02 | End: 2020-03-02 | Stop reason: HOSPADM

## 2020-03-02 RX ORDER — ONDANSETRON 2 MG/ML
4 INJECTION INTRAMUSCULAR; INTRAVENOUS
Status: DISCONTINUED | OUTPATIENT
Start: 2020-03-02 | End: 2020-03-05 | Stop reason: HOSPADM

## 2020-03-02 RX ORDER — ACETAMINOPHEN 500 MG
1000 TABLET ORAL ONCE
Status: COMPLETED | OUTPATIENT
Start: 2020-03-02 | End: 2020-03-02

## 2020-03-02 RX ORDER — LORATADINE 10 MG/1
5 TABLET ORAL
Status: DISCONTINUED | OUTPATIENT
Start: 2020-03-02 | End: 2020-03-05 | Stop reason: HOSPADM

## 2020-03-02 RX ORDER — CIPROFLOXACIN 500 MG/1
500 TABLET ORAL EVERY 12 HOURS
Status: DISCONTINUED | OUTPATIENT
Start: 2020-03-02 | End: 2020-03-05 | Stop reason: HOSPADM

## 2020-03-02 RX ORDER — CEFAZOLIN SODIUM/WATER 2 G/20 ML
2 SYRINGE (ML) INTRAVENOUS
Status: COMPLETED | OUTPATIENT
Start: 2020-03-02 | End: 2020-03-02

## 2020-03-02 RX ADMIN — Medication 10 ML: at 21:33

## 2020-03-02 RX ADMIN — LIDOCAINE HYDROCHLORIDE 80 MG: 20 INJECTION, SOLUTION EPIDURAL; INFILTRATION; INTRACAUDAL; PERINEURAL at 10:01

## 2020-03-02 RX ADMIN — DEXAMETHASONE SODIUM PHOSPHATE 10 MG: 4 INJECTION, SOLUTION INTRAMUSCULAR; INTRAVENOUS at 10:12

## 2020-03-02 RX ADMIN — SODIUM CHLORIDE, SODIUM LACTATE, POTASSIUM CHLORIDE, AND CALCIUM CHLORIDE 1000 ML: 600; 310; 30; 20 INJECTION, SOLUTION INTRAVENOUS at 08:15

## 2020-03-02 RX ADMIN — CIPROFLOXACIN HYDROCHLORIDE 500 MG: 500 TABLET, FILM COATED ORAL at 17:00

## 2020-03-02 RX ADMIN — OXYCODONE HYDROCHLORIDE AND ACETAMINOPHEN 1 TABLET: 5; 325 TABLET ORAL at 13:09

## 2020-03-02 RX ADMIN — FENTANYL CITRATE 25 MCG: 50 INJECTION INTRAMUSCULAR; INTRAVENOUS at 10:13

## 2020-03-02 RX ADMIN — FENTANYL CITRATE 25 MCG: 50 INJECTION INTRAMUSCULAR; INTRAVENOUS at 10:06

## 2020-03-02 RX ADMIN — FENTANYL CITRATE 25 MCG: 50 INJECTION INTRAMUSCULAR; INTRAVENOUS at 10:15

## 2020-03-02 RX ADMIN — Medication 2 G: at 10:08

## 2020-03-02 RX ADMIN — ONDANSETRON 4 MG: 2 INJECTION INTRAMUSCULAR; INTRAVENOUS at 10:12

## 2020-03-02 RX ADMIN — PROPOFOL 200 MG: 10 INJECTION, EMULSION INTRAVENOUS at 10:01

## 2020-03-02 RX ADMIN — OXYCODONE HYDROCHLORIDE AND ACETAMINOPHEN 1 TABLET: 5; 325 TABLET ORAL at 17:49

## 2020-03-02 RX ADMIN — ACETAMINOPHEN 1000 MG: 500 TABLET, FILM COATED ORAL at 08:15

## 2020-03-02 RX ADMIN — OXYCODONE HYDROCHLORIDE AND ACETAMINOPHEN 1 TABLET: 5; 325 TABLET ORAL at 21:33

## 2020-03-02 RX ADMIN — SODIUM CHLORIDE, SODIUM LACTATE, POTASSIUM CHLORIDE, CALCIUM CHLORIDE, AND DEXTROSE MONOHYDRATE 50 ML/HR: 600; 310; 30; 20; 5 INJECTION, SOLUTION INTRAVENOUS at 12:21

## 2020-03-02 RX ADMIN — ROSUVASTATIN 40 MG: 20 TABLET, FILM COATED ORAL at 21:33

## 2020-03-02 RX ADMIN — FENTANYL CITRATE 25 MCG: 50 INJECTION INTRAMUSCULAR; INTRAVENOUS at 10:10

## 2020-03-02 NOTE — PROGRESS NOTES
TRANSFER - IN REPORT:    Verbal report received from Antonio Sanders on Manson Camilla  being received from PACU for routine post - op      Report consisted of patients Situation, Background, Assessment and   Recommendations(SBAR). Information from the following report(s) Procedure Summary, Intake/Output, MAR and Recent Results was reviewed with the receiving nurse. Opportunity for questions and clarification was provided. Assessment completed upon patients arrival to unit and care assumed.

## 2020-03-02 NOTE — PROGRESS NOTES
Care Management Interventions  PCP Verified by CM: Yes(Dr. Mary Rashid)  Mode of Transport at Discharge: Self  Transition of Care Consult (CM Consult): Discharge Planning  Discharge Durable Medical Equipment: No  Physical Therapy Consult: No  Occupational Therapy Consult: No  Speech Therapy Consult: No  Current Support Network: Own Home, Lives with Spouse  Confirm Follow Up Transport: Self  Discharge Location  Discharge Placement: Home    CM met with patient in room. Patient alert and orient, patient wife at bedside. Patient stated he and his wife live in a two story home. Patient stated his mother-in -law lives with them. Patient stated there is one step to enter the residence and 16 steps to get upstairs. Patient stated they mostly live on the main level. DME: n/a  O2: n/a  CPAP: n/a  HD: n/a  ADL: patient stated he is independent at baseline and drives daily. Patient stated he is able to afford his needed medications in the community. Patient stated he would like to return home at discharge. CM will continue to follow patient during hospitalization for discharge planning and needs. Please consult CM for new needs.

## 2020-03-02 NOTE — PROGRESS NOTES
All hourly rounds completed, urine is peach on moderate CBI drip, flowing well, pt c/o pain from catheter that is controlled with percocet, no needs, will report off to oncoming nurse.

## 2020-03-02 NOTE — ANESTHESIA PREPROCEDURE EVALUATION
Anesthetic History   No history of anesthetic complications            Review of Systems / Medical History  Patient summary reviewed, nursing notes reviewed and pertinent labs reviewed    Pulmonary  Within defined limits                 Neuro/Psych   Within defined limits           Cardiovascular    Hypertension: well controlled          Hyperlipidemia    Exercise tolerance: >4 METS     GI/Hepatic/Renal     GERD: well controlled           Endo/Other        Arthritis     Other Findings              Physical Exam    Airway  Mallampati: II  TM Distance: 4 - 6 cm  Neck ROM: normal range of motion   Mouth opening: Normal     Cardiovascular  Regular rate and rhythm,  S1 and S2 normal,  no murmur, click, rub, or gallop  Rhythm: regular  Rate: normal      Pertinent negatives: No murmur   Dental  No notable dental hx       Pulmonary  Breath sounds clear to auscultation               Abdominal         Other Findings            Anesthetic Plan    ASA: 2  Anesthesia type: general          Induction: Intravenous  Anesthetic plan and risks discussed with: Patient      LMA

## 2020-03-02 NOTE — H&P
Giovanna Lozoya  : 1950      Hematuria with kidney stones, bph and left side bladder tumor.     CT  exam dated 2020     CLINICAL INFORMATION: Microscopic hematuria     Spiral images of the kidneys, ureters and bladder were obtained using neither  oral nor intravenous contrast by stone protocol.     All CT scanners at this facility use one or all of the following:  automated  exposure control, adjustment of the mA and or kVp according to patient size,  iterative reconstruction     Visualized portions of liver and spleen are unremarkable. No calcified stones in  the gallbladder. Normal pancreas. Normal adrenals.     Kidneys are normal in size. No renal mass. On the right, 6.5 mm stone midpole  calyx and 5 mm stone lower pole calyx. On the left, 1 mm stone lower pole calyx. No hydronephrosis. No calcified stones in the ureters. Bladder wall appears  thickened and the prostate is enlarged and produces a lobular impression upon  the base of the bladder. 2 stones are noted in the bladder measuring 7.5 and 7  mm.     Atherosclerotic calcification is present in the abdominal aorta. No adenopathy.     There is colonic diverticulosis. No diverticulitis.          IMPRESSION  IMPRESSION:  1. Bilateral renal stones.        2. Enlarged prostate. Thick bladder wall.        3. 2 stones present within the bladder.        4. Diverticulosis   Imaging           CT UROGRAM WO CONT (Order #[id number]) on 2020 - Order Result History Report        HPI   78 y.o., male presents for cystoscopy      History of BPH microscopic hematuria patient comes in for TURBT. Recent CT scan was obtained.     CT showed bilateral renal stones, 2 small bladder stones, enlarged prostate with increased post void residual, and a filling defect in the left side of the bladder wall.   Also degenerative disease of the spine.     PSA 10/23/2019 = 2.46       Past Medical History:   Diagnosis Date    Arthritis       feet and hands    Enlarged prostate      GERD (gastroesophageal reflux disease)       controlled with nexium    Hypercholesterolemia      Hypertension      Kidney stone      Right inguinal hernia              Past Surgical History:   Procedure Laterality Date    HX CERVICAL LAMINECTOMY   2005    HX HERNIA REPAIR Right 2019     inguinal     HX TONSILLECTOMY         x2             Current Outpatient Medications   Medication Sig Dispense Refill    rosuvastatin (CRESTOR) 40 mg tablet Take 40 mg by mouth.        tamsulosin (FLOMAX) 0.4 mg capsule Take 1 Cap by mouth daily for 90 days. 90 Cap 4    STOOL SOFTENER 100 mg capsule TAKE ONE CAPSULE BY MOUTH TWICE A DAY 60 Cap 0    HYDROcodone-acetaminophen (NORCO) 5-325 mg per tablet Take 1-2 Tabs by mouth every six (6) hours as needed for Pain. Max Daily Amount: 8 Tabs. 40 Tab 0    multivitamin (ONE A DAY) tablet Take 1 Tab by mouth daily.        losartan (COZAAR) 50 mg tablet TAKE 1 TABLET DAILY in the am        levocetirizine (XYZAL) 5 mg tablet          esomeprazole (NEXIUM) 40 mg capsule Take 40 mg by mouth every morning.          No Known Allergies  Social History            Socioeconomic History    Marital status:        Spouse name: Not on file    Number of children: Not on file    Years of education: Not on file    Highest education level: Not on file   Occupational History    Not on file   Social Needs    Financial resource strain: Not on file    Food insecurity:       Worry: Not on file       Inability: Not on file    Transportation needs:       Medical: Not on file       Non-medical: Not on file   Tobacco Use    Smoking status: Former Smoker       Packs/day: 1.00       Years: 6.00       Pack years: 6.00       Last attempt to quit: 1973       Years since quittin.3    Smokeless tobacco: Never Used   Substance and Sexual Activity    Alcohol use:  Yes       Alcohol/week: 3.0 standard drinks       Types: 3 Glasses of wine per week    Drug use: No    Sexual activity: Yes       Partners: Female   Lifestyle    Physical activity:       Days per week: Not on file       Minutes per session: Not on file    Stress: Not on file   Relationships    Social connections:       Talks on phone: Not on file       Gets together: Not on file       Attends Restorationist service: Not on file       Active member of club or organization: Not on file       Attends meetings of clubs or organizations: Not on file       Relationship status: Not on file    Intimate partner violence:       Fear of current or ex partner: Not on file       Emotionally abused: Not on file       Physically abused: Not on file       Forced sexual activity: Not on file   Other Topics Concern    Not on file   Social History Narrative    Not on file            Family History   Problem Relation Age of Onset    Hypertension Mother      Cancer Father      Cancer Sister           No data found.       Physical Exam  Constitutional:       Appearance: Normal appearance. HENT:      Head: Normocephalic. Cardiovascular:      Rate and Rhythm: Normal rate and regular rhythm. Pulmonary:      Effort: Pulmonary effort is normal.      Breath sounds: Normal breath sounds. Abdominal:      General: Abdomen is flat. Genitourinary:     Penis: Normal.       Comments: Prostate enlarged  Musculoskeletal: Normal range of motion. Skin:     General: Skin is warm and dry. Neurological:      General: No focal deficit present. Mental Status: He is alert. Psychiatric:         Mood and Affect: Mood normal.                  Office Cystoscopy Procedure :     All risks, benefits and alternatives were again reviewed with patient and he is willing to proceed at this time. His genital area was prepped and draped and a sterile field applied. 2% lidocaine jelly was injected in the the urethra and allowed to dwell for several minutes.   A flexible cystoscope was then inserted into the urethral meatus and advanced under direct vision. The anterior and posterior urethra appeared normal in appearance. Prostate had lateral lobe hypertrophy with some prominence of the intra-vesicle portion particularly anteriorly. There was some mild detrusor hypertrophy. Calcifications are seen along the edge of the prostate on the right side but there was a papillary cancer on the left lateral bladder that was seen on the CT scan. Measured approximately 2-1/2 cm. The cystoscope was then removed under direct vision. The patient tolerated the procedure well.     Assessment and Plan      ICD-10-CM ICD-9-CM     1. BPH without obstruction/lower urinary tract symptoms N40.0 600.00 AMB POC URINALYSIS DIP STICK AUTO W/ MICRO (PGU)         CYSTOURETHROSCOPY   2. Bilateral renal stones N20.0 592.0     3. Bladder calculi N21.0 594. 1     4. 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 2  For TURBT      CT scan showed bilateral renal stones. A few small stones in the bladder and enlarged prostate and thickening of the bladder wall in the left lateral aspect. Patient's father had bladder cancer. Tumor found in left anterior bladder.     Patient will need to have a transurethral resection of the bladder tumor. Observation admission. Discussed procedure including the risk of complication bleeding infection perforation of the bladder. Cannot do the TURP at this time but eventually he is going to need a TURP as he does not empty completely. Recently had upper respiratory infection took some decongestants and antihistamines and had more trouble voiding. He is on medicine for BPH right now. At the time of the bladder tumor resection we will try to get the bladder stones out as well. The renal stones are nonobstructing and can be followed.     Follow-up and Dispositions    · Return for Patient will be set up for a surgical procedure in the near future.

## 2020-03-02 NOTE — ANESTHESIA POSTPROCEDURE EVALUATION
Procedure(s):  TRANSURETHRAL RESECTION OF BLADDER TUMOR WITH BIPOLAR. general    Anesthesia Post Evaluation      Multimodal analgesia: multimodal analgesia used between 6 hours prior to anesthesia start to PACU discharge  Patient location during evaluation: bedside  Patient participation: complete - patient participated  Level of consciousness: awake and responsive to light touch  Pain management: adequate  Airway patency: patent  Anesthetic complications: no  Cardiovascular status: acceptable, hemodynamically stable, blood pressure returned to baseline and stable  Respiratory status: acceptable, unassisted, spontaneous ventilation and nonlabored ventilation  Hydration status: acceptable  Post anesthesia nausea and vomiting:  controlled      Vitals Value Taken Time   /78 3/2/2020 11:56 AM   Temp 36.5 °C (97.7 °F) 3/2/2020 11:36 AM   Pulse 82 3/2/2020 11:59 AM   Resp 16 3/2/2020 11:36 AM   SpO2 93 % 3/2/2020 11:59 AM   Vitals shown include unvalidated device data.

## 2020-03-02 NOTE — PERIOP NOTES
TRANSFER - OUT REPORT:    Verbal report given to [facility] on Giovanna Lozoya  being transferred to [id number] for routine post - op       Report consisted of patients Situation, Background, Assessment and   Recommendations(SBAR). Information from the following report(s) SBAR, Kardex, OR Summary, Procedure Summary and MAR was reviewed with the receiving nurse. Lines:   Peripheral IV 03/02/20 Right Wrist (Active)   Site Assessment Clean, dry, & intact 3/2/2020 11:36 AM   Phlebitis Assessment 0 3/2/2020 11:36 AM   Infiltration Assessment 0 3/2/2020 11:36 AM   Dressing Status Clean, dry, & intact 3/2/2020 11:36 AM   Dressing Type Tape;Transparent 3/2/2020 11:36 AM   Hub Color/Line Status Green;Patent 3/2/2020 11:36 AM   Alcohol Cap Used No 3/2/2020 11:36 AM        Opportunity for questions and clarification was provided. VTE prophylaxis orders have been written for Giovanna Lozoya. Patient and family given floor number and nurses name. Family updated re: pt status after security code verified.

## 2020-03-02 NOTE — PROGRESS NOTES
03/02/20 1230   Dual Skin Pressure Injury Assessment   Dual Skin Pressure Injury Assessment WDL   Second Care Provider (Based on [address] [place] [address])   (FARSHAD de jesus)   Skin Integumentary   Skin Integumentary (WDL) WDL     Pt to room 605 from surgery, assessment completed, pt denies complaints at this time, CBI infusing though 3 way gracia at moderate drip, urine peach in color, pt oriented to room, wife at bedside, instructed to call with needs, will monitor.

## 2020-03-03 PROCEDURE — 65270000029 HC RM PRIVATE

## 2020-03-03 PROCEDURE — 99218 HC RM OBSERVATION: CPT

## 2020-03-03 PROCEDURE — 74011250637 HC RX REV CODE- 250/637: Performed by: UROLOGY

## 2020-03-03 PROCEDURE — 77030018836 HC SOL IRR NACL ICUM -A

## 2020-03-03 PROCEDURE — 74011250636 HC RX REV CODE- 250/636: Performed by: UROLOGY

## 2020-03-03 RX ADMIN — OXYCODONE HYDROCHLORIDE AND ACETAMINOPHEN 1 TABLET: 5; 325 TABLET ORAL at 18:41

## 2020-03-03 RX ADMIN — SODIUM CHLORIDE, SODIUM LACTATE, POTASSIUM CHLORIDE, CALCIUM CHLORIDE, AND DEXTROSE MONOHYDRATE 50 ML/HR: 600; 310; 30; 20; 5 INJECTION, SOLUTION INTRAVENOUS at 06:00

## 2020-03-03 RX ADMIN — CIPROFLOXACIN HYDROCHLORIDE 500 MG: 500 TABLET, FILM COATED ORAL at 08:36

## 2020-03-03 RX ADMIN — CIPROFLOXACIN HYDROCHLORIDE 500 MG: 500 TABLET, FILM COATED ORAL at 22:03

## 2020-03-03 RX ADMIN — Medication 10 ML: at 13:57

## 2020-03-03 RX ADMIN — OXYCODONE HYDROCHLORIDE AND ACETAMINOPHEN 1 TABLET: 5; 325 TABLET ORAL at 13:29

## 2020-03-03 RX ADMIN — TAMSULOSIN HYDROCHLORIDE 0.4 MG: 0.4 CAPSULE ORAL at 08:35

## 2020-03-03 RX ADMIN — Medication 10 ML: at 05:58

## 2020-03-03 RX ADMIN — OXYCODONE HYDROCHLORIDE AND ACETAMINOPHEN 1 TABLET: 5; 325 TABLET ORAL at 03:18

## 2020-03-03 RX ADMIN — PANTOPRAZOLE SODIUM 40 MG: 40 TABLET, DELAYED RELEASE ORAL at 08:35

## 2020-03-03 RX ADMIN — OXYCODONE HYDROCHLORIDE AND ACETAMINOPHEN 1 TABLET: 5; 325 TABLET ORAL at 08:34

## 2020-03-03 RX ADMIN — ROSUVASTATIN 40 MG: 20 TABLET, FILM COATED ORAL at 22:03

## 2020-03-03 NOTE — PROGRESS NOTES
CBI is running at a slow drip, urine in gracia bag is clear darker pink. Patient's pain has been controlled with percocet. All hourly rounds complete. Will continue to monitor and give report to oncoming nurse.

## 2020-03-03 NOTE — PROGRESS NOTES
POD#1 post TURBT  One medium and 2 small lesions and laser of 2 small bladder stones. Pt with red urine and no clots. Some bladder pain and eating breakfast.      Keep gracia today and consider remove gracia in AM if urine clear.     Patient Active Problem List   Diagnosis Code    BPH without obstruction/lower urinary tract symptoms N40.0    Urinary frequency R35.0    Right inguinal hernia K40.90    Bilateral renal stones N20.0    Bladder calculi N21.0    Malignant neoplasm of lateral wall of urinary bladder (HCC) C67.2    Bladder cancer ([facility] [address] C67.9

## 2020-03-03 NOTE — PROGRESS NOTES
Utilization Review Physician has recommended this patient is most appropriate as Outpatient Status receiving Observation Services. The Attending provider agreed and an outpatient order was placed on the chart as the Attending Provider requested. The patient will be provided the documentation for a Condition Code 44, conversion from Inpatient to Outpatient Status, and questions answered. The MOON letter explaining the outpatient/observation services was given to patient with verbal explanation and verbal understanding was received about information given. Letter signed by patient with date and time. Signed copy placed in the patient's chart for scanning by HIS and copy left at bedside for patient information.  JANAY RN [id number] notified

## 2020-03-03 NOTE — PROGRESS NOTES
Hourly rounds completed this shift. Patient complained of pain & medicated per MAR. CBI running red, clear output throughout the night. All needs met at this time. Will continue to monitor & give report to oncoming RN.

## 2020-03-04 PROCEDURE — 77030040831 HC BAG URINE DRNG MDII -A

## 2020-03-04 PROCEDURE — 74011250637 HC RX REV CODE- 250/637: Performed by: UROLOGY

## 2020-03-04 PROCEDURE — 77030040832 HC IRR TRAY MDII -A

## 2020-03-04 PROCEDURE — 74011250636 HC RX REV CODE- 250/636: Performed by: UROLOGY

## 2020-03-04 PROCEDURE — 77030034696 HC CATH URETH FOL 2W BARD -A

## 2020-03-04 PROCEDURE — 96375 TX/PRO/DX INJ NEW DRUG ADDON: CPT

## 2020-03-04 PROCEDURE — 77030018836 HC SOL IRR NACL ICUM -A

## 2020-03-04 PROCEDURE — 65270000029 HC RM PRIVATE

## 2020-03-04 PROCEDURE — 96374 THER/PROPH/DIAG INJ IV PUSH: CPT

## 2020-03-04 PROCEDURE — 99218 HC RM OBSERVATION: CPT

## 2020-03-04 PROCEDURE — 51798 US URINE CAPACITY MEASURE: CPT

## 2020-03-04 RX ORDER — FINASTERIDE 5 MG/1
5 TABLET, FILM COATED ORAL DAILY
Status: DISCONTINUED | OUTPATIENT
Start: 2020-03-04 | End: 2020-03-05 | Stop reason: HOSPADM

## 2020-03-04 RX ORDER — OXYCODONE AND ACETAMINOPHEN 5; 325 MG/1; MG/1
1 TABLET ORAL
Qty: 15 TAB | Refills: 0 | Status: SHIPPED | OUTPATIENT
Start: 2020-03-04 | End: 2020-03-07

## 2020-03-04 RX ORDER — CIPROFLOXACIN 500 MG/1
500 TABLET ORAL EVERY 12 HOURS
Qty: 10 TAB | Refills: 0 | Status: SHIPPED | OUTPATIENT
Start: 2020-03-04 | End: 2020-03-12

## 2020-03-04 RX ORDER — FINASTERIDE 5 MG/1
5 TABLET, FILM COATED ORAL DAILY
Qty: 60 TAB | Refills: 3 | Status: SHIPPED | OUTPATIENT
Start: 2020-03-05 | End: 2020-05-20 | Stop reason: ALTCHOICE

## 2020-03-04 RX ADMIN — PANTOPRAZOLE SODIUM 40 MG: 40 TABLET, DELAYED RELEASE ORAL at 05:26

## 2020-03-04 RX ADMIN — ROSUVASTATIN 40 MG: 20 TABLET, FILM COATED ORAL at 21:46

## 2020-03-04 RX ADMIN — ONDANSETRON 4 MG: 2 INJECTION INTRAMUSCULAR; INTRAVENOUS at 18:02

## 2020-03-04 RX ADMIN — OXYCODONE HYDROCHLORIDE AND ACETAMINOPHEN 1 TABLET: 5; 325 TABLET ORAL at 00:24

## 2020-03-04 RX ADMIN — FINASTERIDE 5 MG: 5 TABLET, FILM COATED ORAL at 08:10

## 2020-03-04 RX ADMIN — Medication 10 ML: at 21:47

## 2020-03-04 RX ADMIN — SODIUM CHLORIDE, SODIUM LACTATE, POTASSIUM CHLORIDE, CALCIUM CHLORIDE, AND DEXTROSE MONOHYDRATE 50 ML/HR: 600; 310; 30; 20; 5 INJECTION, SOLUTION INTRAVENOUS at 03:00

## 2020-03-04 RX ADMIN — MORPHINE SULFATE 2 MG: 2 INJECTION, SOLUTION INTRAMUSCULAR; INTRAVENOUS at 18:02

## 2020-03-04 RX ADMIN — OXYCODONE HYDROCHLORIDE AND ACETAMINOPHEN 1 TABLET: 5; 325 TABLET ORAL at 11:53

## 2020-03-04 RX ADMIN — CIPROFLOXACIN HYDROCHLORIDE 500 MG: 500 TABLET, FILM COATED ORAL at 08:10

## 2020-03-04 RX ADMIN — TAMSULOSIN HYDROCHLORIDE 0.4 MG: 0.4 CAPSULE ORAL at 08:10

## 2020-03-04 RX ADMIN — CIPROFLOXACIN HYDROCHLORIDE 500 MG: 500 TABLET, FILM COATED ORAL at 21:46

## 2020-03-04 NOTE — PROGRESS NOTES
Problem: Falls - Risk of  Goal: *Absence of Falls  Description  Document Junmary VillegasKeri Fall Risk and appropriate interventions in the flowsheet.   Outcome: Progressing Towards Goal  Note: Fall Risk Interventions:            Medication Interventions: Patient to call before getting OOB                   Problem: Patient Education: Go to Patient Education Activity  Goal: Patient/Family Education  Outcome: Progressing Towards Goal     Problem: Infection - Risk of, Urinary Catheter-Associated Urinary Tract Infection  Goal: *Absence of infection signs and symptoms  Outcome: Progressing Towards Goal     Problem: Patient Education: Go to Patient Education Activity  Goal: Patient/Family Education  Outcome: Progressing Towards Goal

## 2020-03-04 NOTE — PROGRESS NOTES
Bladder scan completed with 578 mL noted. Primary RN and NP updated. In/out cath with 16 fr coude completed using sterile technique. 600 mL eri UOP with only one small clot noted. No irrigation needed at this time. Catheter removed. Pt tolerated well.

## 2020-03-04 NOTE — PROGRESS NOTES
Pt has only voided about 50 ml of blood tinged urine, currently has SP pain/pressure. Has ambulated and continues to drink fluids. Bladder scan shows 578 ml. RN to I &O cath pt, he may have obstructing clot/fragment. If urine has several clots, she will irrigate. If not, she will drain bladder and give him another chance to void. Home when voiding successfully.

## 2020-03-04 NOTE — PROGRESS NOTES
POD#2 post TURBT and bladder stone laser    Urine very clear will remove gracia and D//C this PM if he voids well       DIAGNOSIS   BLADDER TUMOR: PAPILLARY UROTHELIAL CARCINOMA, LOW GRADE, NONINVASIVE.   tls/3/3/2020   Electronically signed out on 3/3/2020 12:40 by Marcia Guillory.  Maria C Monroy MD

## 2020-03-04 NOTE — OP NOTES
[facility]  OPERATIVE REPORT    Name:  Donte Hammer  MR#:  [id number]  :  1950  ACCOUNT #:  [de-identified]  DATE OF SERVICE:  2020    PREOPERATIVE DIAGNOSES:  Hematuria, left-sided bladder tumor and bladder stones with benign prostatic hyperplasia and bilateral renal stones. POSTOPERATIVE DIAGNOSES:  Hematuria, two left-sided bladder tumors, one medium, one small, and a small right-sided tumor and bladder stones with benign prostatic hyperplasia and bilateral renal stones. PROCEDURE PERFORMED:  Bipolar transurethral resection of medium bladder tumor and laser lithotripsy of two 1-cm bladder stones. SURGEON:  Svitlana Pelayo M.D.    ASSISTANT:  None. ANESTHESIA:  General.    COMPLICATIONS:  .    SPECIMENS REMOVED:  .    IMPLANTS:  .    ESTIMATED BLOOD LOSS:  Minimal.    OPERATIVE INDICATIONS:  The patient is a 65-year-old male with gross hematuria, found on cystoscopy and CT scan to have a bladder tumor on the left lateral bladder wall, admitted for resection. PROCEDURE:  The patient was taken to the operating room suite, underwent general anesthesia, placed in a dorsal lithotomy position, prepped with Betadine and draped in appropriate manner. Urethra was calibrated up #26-Lao with Delcie Labor sound. A 26-Lao resectoscope was passed per urethra into the bladder. The patient had some enlargement of the prostate with some prominence of the median lobe. There was also two stones in the bladder about a centimeter each that could not be pulled out with the scope. At this point the bladder was inspected. There was noted to be a 2.5-cm tumor on the left lateral bladder wall and superior and lateral to this was another 1-cm tumor. Also noted on the edge of the trigone just of the right of the midline was an area of superficial transitional cell cancer, no bigger than a 0.5 cm. Using the loop electrode, the tissue was resected down into the muscle layer.   There was good hemostasis once the tumor was resected. This was the 2.5-cm lesion and then the 1-cm lesion was resected as well. The base of the tumor on the left side, both were cauterized with electrocautery in the base and around the edges and then at the right-sided small lesion was fulgurated with the loop electrode. No other lesions were seen in the bladder. At this point, the Holmium laser fiber was used to break the stones in the bladder into multiple small pieces which were irrigated out. The patient tolerated the procedure well. A three-way Barcenas catheter was inserted at the end of the case and he was sent to recovery area in stable condition.         Jayden Gong MD      [id number]  D:  03/03/2020 12:29  T:  03/03/2020 22:39  JOB #:  [id number]

## 2020-03-04 NOTE — PROGRESS NOTES
Hourly rounds completed. Patient feeling better after in/out cath. Patient will call if/when voids to measure. If voiding well, patient may still discharge home this evening. Otherwise will stay until tomorrow for voiding trial.  No needs at this time. Will continue to monitor and give report to oncoming RN.

## 2020-03-04 NOTE — PROGRESS NOTES
Patient only able to urinate about 45 mL with excruciating pain, says he felt like he was going to pass out. Bladder scanned patient, showed >200 mL on post void scan. 18 fr gracia catheter inserted per NP orders, immediate drainage of approximately 250 mL yellow/light pink urine. Discharge on hold until tomorrow.

## 2020-03-05 VITALS
TEMPERATURE: 98.4 F | WEIGHT: 178 LBS | DIASTOLIC BLOOD PRESSURE: 69 MMHG | HEART RATE: 72 BPM | RESPIRATION RATE: 19 BRPM | BODY MASS INDEX: 26.36 KG/M2 | OXYGEN SATURATION: 91 % | HEIGHT: 69 IN | SYSTOLIC BLOOD PRESSURE: 121 MMHG

## 2020-03-05 PROCEDURE — 77030012865 HC BG URIN LEG MDII -A

## 2020-03-05 PROCEDURE — 99218 HC RM OBSERVATION: CPT

## 2020-03-05 PROCEDURE — 74011250637 HC RX REV CODE- 250/637: Performed by: UROLOGY

## 2020-03-05 RX ORDER — PHENAZOPYRIDINE HYDROCHLORIDE 200 MG/1
200 TABLET, FILM COATED ORAL
Qty: 15 TAB | Refills: 1 | Status: SHIPPED | OUTPATIENT
Start: 2020-03-05 | End: 2020-03-08

## 2020-03-05 RX ADMIN — PANTOPRAZOLE SODIUM 40 MG: 40 TABLET, DELAYED RELEASE ORAL at 05:05

## 2020-03-05 RX ADMIN — FINASTERIDE 5 MG: 5 TABLET, FILM COATED ORAL at 08:37

## 2020-03-05 RX ADMIN — Medication 10 ML: at 05:05

## 2020-03-05 RX ADMIN — CIPROFLOXACIN HYDROCHLORIDE 500 MG: 500 TABLET, FILM COATED ORAL at 08:36

## 2020-03-05 RX ADMIN — TAMSULOSIN HYDROCHLORIDE 0.4 MG: 0.4 CAPSULE ORAL at 08:36

## 2020-03-05 NOTE — PROGRESS NOTES
Pt could not void and gracia replaced  With urinary retentionand gracia replaced and no blood  Will send with gracia for 1 week and remove gracia next Wednesday  To go home on maximal prostate medication

## 2020-03-05 NOTE — DISCHARGE INSTRUCTIONS
Patient Education        Bladder Cancer: Care Instructions  Your Care Instructions    Bladder cancer occurs when abnormal cells grow out of control in the bladder. It usually can be cured when it is found early. It is more common in older people. Treatment may include surgery to remove part of the bladder. If the tumor is large, the entire bladder may be removed. You may also have radiation or chemotherapy to kill the cancer cells. Sometimes people get treatment with medicines that help the body's natural defenses, or immune system, fight the cancer. Finding out that you have cancer is scary. You may feel many emotions and may need some help coping. Seek out family, friends, and counselors for support. You also can do things at home to make yourself feel better while you go through treatment. Call the [employer] (8-508.200.6443) or visit its website at [address] for more information. Follow-up care is a key part of your treatment and safety. Be sure to make and go to all appointments, and call your doctor if you are having problems. It's also a good idea to know your test results and keep a list of the medicines you take. How can you care for yourself at home? · Take your medicines exactly as prescribed. Call your doctor if you think you are having a problem with your medicine. You may get medicine for nausea and vomiting if you have these side effects. · Eat healthy food. If you are not hungry, try to eat food that has protein and extra calories to keep up your strength and prevent weight loss. Drink liquid meal replacements for extra calories and protein. Try to eat your main meal early. · Get some physical activity every day, but do not get too tired. Keep doing the hobbies you enjoy as your energy allows. · Take steps to control your stress and workload. Learn relaxation techniques. ? Share your feelings. Stress and tension affect our emotions.  By expressing your feelings to others, you may be able to understand and cope with them. ? Consider joining a support group. Talking about a problem with your spouse, a good friend, or other people with similar problems is a good way to reduce tension and stress. ? Express yourself through art. Try writing, dance, art, or crafts to relieve tension. Some dance, writing, or art groups may be available just for people who have cancer. ? Be kind to your body and mind. Getting enough sleep, eating a healthy diet, and taking time to do things you enjoy can contribute to an overall feeling of balance in your life and help reduce stress. ? Get help if you need it. Discuss your concerns with your doctor or counselor. · If you are vomiting or have diarrhea:  ? Drink plenty of fluids (enough so that your urine is light yellow or clear like water) to prevent dehydration. Choose water and other caffeine-free clear liquids. If you have kidney, heart, or liver disease and have to limit fluids, talk with your doctor before you increase the amount of fluids you drink. ? When you are able to eat, try clear soups, mild foods, and liquids until all symptoms are gone for 12 to 48 hours. Other good choices include dry toast, crackers, cooked cereal, and gelatin dessert, such as Jell-O.  · Take care of your urinary tract to prevent problems such as infection, which can be caused by bladder cancer and its treatment. Limit drinks with caffeine, drink plenty of fluids, and urinate every 3 to 4 hours. · If you have not already done so, prepare a list of advance directives. Advance directives are instructions to your doctor and family members about what kind of care you want if you become unable to speak for yourself. When should you call for help? Call your doctor now or seek immediate medical care if:    · You have pain that does not get better after taking pain medicine.     · You have symptoms of a kidney infection.  These may include:  ? Pain or burning when you urinate. ? A frequent need to urinate without being able to pass much urine. ? Pain in the flank, which is just below the rib cage and above the waist on either side of the back. ? Blood in your urine. ? A fever.    Watch closely for changes in your health, and be sure to contact your doctor if:    · You do not get better as expected. Where can you learn more? Go to http://usha-juan carlos.info/. Enter M796 in the search box to learn more about \"Bladder Cancer: Care Instructions. \"  Current as of: December 19, 2018  Content Version: 12.2  © 3148-9693 [employer]. Care instructions adapted under license by [employer] (which disclaims liability or warranty for this information). If you have questions about a medical condition or this instruction, always ask your healthcare professional. [address] any warranty or liability for your use of this information.

## 2020-03-05 NOTE — PROGRESS NOTES
Chart screened by  for discharge planning. Patient unable to void s/p gracia removal, gracia replaced, patient will discharge home and follow up with urology in 1 week for removal. Patient will return home at discharge. No CM needs have been identified at this time. CM will continue to follow patient during hospitalization for discharge planning and needs. Please consult  if any new issues arise.

## 2020-03-05 NOTE — PROGRESS NOTES
Hourly rounds completed on patient. Patient gracia draining pink urine, Patient denies any needs at this time. Will report to oncoming nurse.

## 2020-03-06 NOTE — DISCHARGE SUMMARY
.  Discharge Summary     Patient: Ronald Barlow MRN: [id number]  SSN: [id number]    YOB: 1950  Age: 78 y.o. Sex: male      Allergies: Patient has no known allergies. Admit Date: 3/2/2020    Discharge Date: 3/6/2020     * Admission Diagnoses:  Benign prostatic hyperplasia, unspecified whether lower urinary tract symptoms present [N40.0]  Bladder cancer [facility]) [C67.9]  Bladder cancer ([facility] [C67.9]     * Discharge Diagnoses:   Hospital Problems as of 3/5/2020 Date Reviewed: 1/27/2020          Codes Class Noted - Resolved POA    Bladder cancer ([facility] ICD-10-CM: C67.9  ICD-9-CM: 188.9  3/2/2020 - Present Unknown               * Procedures for this admission:   Procedure(s):  TRANSURETHRAL RESECTION OF BLADDER TUMOR WITH BIPOLAR      * Disposition: Home    * Discharged Condition: improved    * Hospital Course:      Mr. Christine Christy is a 71-year-old male with hx of bladder tumor and bladder stone and is s/p cystoscopy and TURBT with laser of bladder stone on 3/2/2020 by Dr. Tee Lauren. By POD 2, urine clear and gracia removed. He was only able to void small amounts with increasing bladder pain/pressure, had I&O cath followed by indwelling gracia placement. He will RTO in 1 week for gracia removal and VT. DIAGNOSIS   BLADDER TUMOR: PAPILLARY UROTHELIAL CARCINOMA, LOW GRADE, NONINVASIVE.   tls/3/3/2020   Electronically signed out on 3/3/2020 12:40 by Kell Camarillo.  Ashley Mukherjee MD     Patient Active Problem List   Diagnosis Code    BPH without obstruction/lower urinary tract symptoms N40.0    Urinary frequency R35.0    Right inguinal hernia K40.90    Bilateral renal stones N20.0    Bladder calculi N21.0    Malignant neoplasm of lateral wall of urinary bladder (HCC) C67.2    Bladder cancer ([facility] C67.9         Discharge Medications:   Discharge Medication List as of 3/5/2020  8:48 AM      START taking these medications    Details   phenazopyridine (PYRIDIUM) 200 mg tablet Take 1 Tab by mouth three (3) times daily as needed for Pain for up to 3 days. Indications: urinary tract irritation, Print, Disp-15 Tab, R-1      oxyCODONE-acetaminophen (PERCOCET) 5-325 mg per tablet Take 1 Tab by mouth every four (4) hours as needed for Pain for up to 3 days. Max Daily Amount: 6 Tabs., Normal, Disp-15 Tab, R-0      ciprofloxacin HCl (CIPRO) 500 mg tablet Take 1 Tab by mouth every twelve (12) hours. , Normal, Disp-10 Tab, R-0      finasteride (PROSCAR) 5 mg tablet Take 1 Tab by mouth daily. , Normal, Disp-60 Tab, R-3         CONTINUE these medications which have NOT CHANGED    Details   cholecalciferol, vitamin D3, (VITAMIN D3 PO) Take  by mouth., Historical Med      Berb Devine/herbal complex no.18 (BERBERINE-HERBAL COMB NO.18 PO) Take  by mouth., Historical Med      fluticasone propionate (FLONASE NA) by Nasal route., Historical Med      beta-carotene,A,-vits C,E/mins (OCUVITE PO) Take  by mouth., Historical Med      rosuvastatin (CRESTOR) 40 mg tablet Take 40 mg by mouth nightly., Historical Med      tamsulosin (FLOMAX) 0.4 mg capsule Take 1 Cap by mouth daily for 90 days. , Normal, Disp-90 Cap, R-4      multivitamin (ONE A DAY) tablet Take 1 Tab by mouth daily. , Historical Med      levocetirizine (XYZAL) 5 mg tablet daily. , Historical Med      esomeprazole (NEXIUM) 40 mg capsule Take 40 mg by mouth every morning., Historical Med         STOP taking these medications       ibuprofen (ADVIL) 200 mg tablet Comments:   Reason for Stopping:                * Follow-up Care/Patient Instructions: Activity: Activity as tolerated  Diet: Regular Diet  Wound Care: None needed    Follow-up Information     Follow up With Specialties Details Why Contact Info    Jet Galicia MD Internal Medicine   [address]  [phone]             Discharge diagnosis is low-grade superficial non-muscle invasive bladder cancer. Bladder stones bilateral renal stones BPH with postoperative urinary retention.     Patient will be discharged home on finasteride and tamsulosin and will keep the catheter for a week to come in early in the morning to have it removed by the nurse practitioner.     Carlos GRAVES

## 2020-04-30 ENCOUNTER — HOSPITAL ENCOUNTER (OUTPATIENT)
Dept: SURGERY | Age: 70
Discharge: HOME | End: 2020-04-30
Payer: MEDICARE

## 2020-04-30 VITALS
DIASTOLIC BLOOD PRESSURE: 63 MMHG | RESPIRATION RATE: 16 BRPM | BODY MASS INDEX: 26.22 KG/M2 | SYSTOLIC BLOOD PRESSURE: 127 MMHG | HEART RATE: 65 BPM | TEMPERATURE: 98.3 F | WEIGHT: 177 LBS | OXYGEN SATURATION: 96 % | HEIGHT: 69 IN

## 2020-04-30 LAB — HGB BLD-MCNC: 11.7 G/DL (ref 13.6–17.2)

## 2020-04-30 PROCEDURE — 85018 HEMOGLOBIN: CPT

## 2020-04-30 RX ORDER — TAMSULOSIN HYDROCHLORIDE 0.4 MG/1
0.4 CAPSULE ORAL
COMMUNITY
End: 2020-05-20 | Stop reason: ALTCHOICE

## 2020-04-30 RX ORDER — LISINOPRIL 10 MG/1
TABLET ORAL DAILY
COMMUNITY
End: 2020-06-16

## 2020-04-30 NOTE — PERIOP NOTES
Patient verified name and     Order for consent not found in EHR at time of PATand matches case posting; patient verified. Type 1b surgery, pat assessment complete. Labs per surgeon: none; results -  Labs per anesthesia protocol: hgb,type and screen- dos; results -  EKG: [facility] approved surgical skin cleanser and instructions given per hospital policy. Patient provided with and instructed on educational handouts including Guide to Surgery, Pain Management, Hand Hygiene, Blood Transfusion Education, and [facility] Brochure. Patient answered medical/surgical history questions at their best of ability. All prior to admission medications documented in [facility]. Original medication prescription bottle some visualized during patient appointment. Patient instructed to hold all vitamins 7 days prior to surgery and NSAIDS 5 days prior to surgery, patient verbalized understanding. Patient teach back successful and patient demonstrates knowledge of instructions.

## 2020-04-30 NOTE — PERIOP NOTES
PLEASE CONTINUE TAKING ALL PRESCRIPTION MEDICATIONS UP TO THE DAY OF SURGERY UNLESS OTHERWISE DIRECTED BELOW. DISCONTINUE all vitamins and supplements 7 days prior to surgery. DISCONTINUE Non-Steriodal Anti-Inflammatory (NSAIDS) such as Advil and Aleve 5 days prior to surgery. Home Medications to take  the day of surgery   nexium,finasteride           Home Medications   to [facility] d,multivitamin,celebrex        Comments                Please do not bring home medications with you on the day of surgery unless otherwise directed by your nurse. If you are instructed to bring home medications, please give them to your nurse as they will be administered by the nursing staff. If you have any questions, please call [facility] [phone] or [facility] [phone]. A copy of this note was provided to the patient for reference.

## 2020-05-04 ENCOUNTER — ANESTHESIA EVENT (OUTPATIENT)
Dept: SURGERY | Age: 70
End: 2020-05-04
Payer: MEDICARE

## 2020-05-04 NOTE — H&P
History and Physical    Subjective:      Kassie Beck is a 78 y.o. male evaluated with a urinary retention. He also had hematuria and bladder tumor resected recently. Because of his urinary retention he is kept a urinary tract infection with E. coli its resistance. Patient had a fairly large bladder tumor that is been healing and is been unable to get his urine cleared and is developed severe epididymitis because of it first on the left than on the right has been on gentamicin injections. He now needs to have a TURP to help him emptied so he can eliminate this infection.     Susceptibility      Escherichia coli Escherichia coli     Not Specified Not Specified    Amoxicillin/Clavulanic A I ug/mL I I ug/mL I    Ampicillin ($) R ug/mL R R ug/mL R    Cefazolin ($) R ug/mL R R ug/mL R    Cefepime ($$) S ug/mL S S ug/mL S    Ceftriaxone ($) R ug/mL R R ug/mL R    Cefuroxime ($) R ug/mL R R ug/mL R    Ciprofloxacin ($) R ug/mL R R ug/mL R    Ertapenem ($$$$) S ug/mL S S ug/mL S    Gentamicin ($) S ug/mL S S ug/mL S    Imipenem S ug/mL S S ug/mL S    Levofloxacin ($) R ug/mL R R ug/mL R    Meropenem ($$) S ug/mL S S ug/mL S    Nitrofurantoin S ug/mL S S ug/mL S    Piperacillin/Tazobac ($) S ug/mL S S ug/mL S    Tetracycline R ug/mL R R ug/mL R    Tobramycin ($) S ug/mL S S ug/mL S    Trimeth-Sulfamethoxa R ug/mL R R ug/mL R            Susceptibility Comments     Escherichia coli         Past Medical History:   Diagnosis Date    Arthritis     feet and hands    Cancer ([facility]     bladder    Enlarged prostate     GERD (gastroesophageal reflux disease)     controlled with nexium    Hypercholesterolemia     Hypertension     medication    Kidney stone     Right inguinal hernia      Past Surgical History:   Procedure Laterality Date    HX CERVICAL LAMINECTOMY  2005    HX COLONOSCOPY      HX ENDOSCOPY      HX HERNIA REPAIR Right 2019    inguinal     HX TONSILLECTOMY      x2    HX UROLOGICAL      bladder tumor removal      Family History   Problem Relation Age of Onset    Hypertension Mother     Cancer Father     Cancer Sister      Social History     Tobacco Use    Smoking status: Former Smoker     Packs/day: 1.00     Years: 6.00     Pack years: 6.00     Last attempt to quit: 1973     Years since quittin.3    Smokeless tobacco: Never Used   Substance Use Topics    Alcohol use: Yes     Alcohol/week: 3.0 standard drinks     Types: 3 Glasses of wine per week     Comment: occ     No Known Allergies  Prior to Admission medications    Medication Sig Start Date End Date Taking? Authorizing Provider   tamsulosin (FLOMAX) 0.4 mg capsule Take 0.4 mg by mouth nightly. Provider, Historical   Lactobac no.41/Bifidobact no.7 (PROBIOTIC-10 PO) Take  by mouth. Provider, Historical   lisinopriL (PRINIVIL, ZESTRIL) 10 mg tablet Take  by mouth daily. Provider, Historical   celecoxib (CELEBREX) 200 mg capsule Take 1 Cap by mouth two (2) times a day for 10 days. 20  Cathy Sinha MD   azithromycin (ZITHROMAX) 500 mg tab Take 1 Tab by mouth See Admin Instructions for 5 days. 4/28/20 5/3/20  Cathy Sinha MD   finasteride (PROSCAR) 5 mg tablet Take 1 Tab by mouth daily. 3/5/20   Medhat Wilkerson NP   cholecalciferol, vitamin D3, (VITAMIN D3 PO) Take  by mouth. Provider, Historical   Berb Devine/herbal complex no.18 (BERBERINE-HERBAL COMB NO.18 PO) Take  by mouth. Provider, Historical   fluticasone propionate (FLONASE NA) by Nasal route. Provider, Historical   beta-carotene,A,-vits C,E/mins (OCUVITE PO) Take  by mouth. Provider, Historical   rosuvastatin (CRESTOR) 40 mg tablet Take 40 mg by mouth nightly. 19   Provider, Historical   multivitamin (ONE A DAY) tablet Take 1 Tab by mouth daily. Provider, Historical   levocetirizine (XYZAL) 5 mg tablet daily. 6/18/15   Provider, Historical   esomeprazole (NEXIUM) 40 mg capsule Take 40 mg by mouth every morning.     Provider, Historical Review of Systems:  Pertinent items are noted in HPI. Objective:      No data found. No data recorded. Physical Exam:  GENERAL: alert, cooperative, no distress, appears stated age, LUNG: clear to auscultation bilaterally, HEART: regular rate and rhythm, S1, S2 normal, no murmur, click, rub or gallop, ABDOMEN: soft, non-tender. Bowel sounds normal. No masses,  no organomegaly  Rt testis swelling and tender. Assessment:   Recent bladder tumor and with urine retention and bilateral epididiymitis and  Pt for TURP also history of kidney stones. Kidney stones are in both kidneys. Plan:     1. The risks, benefits, complications, treatment options, and expected outcomes were discussed with the patient. The patient understands the risks, any and all questions were answered to the patient's satisfaction.   2. Proceed with TURP and IV antibiotics

## 2020-05-05 ENCOUNTER — ANESTHESIA (OUTPATIENT)
Dept: SURGERY | Age: 70
End: 2020-05-05
Payer: MEDICARE

## 2020-05-05 ENCOUNTER — HOSPITAL ENCOUNTER (OUTPATIENT)
Age: 70
Discharge: HOME | End: 2020-05-07
Attending: UROLOGY | Admitting: UROLOGY
Payer: MEDICARE

## 2020-05-05 PROBLEM — N40.1 BPH WITH OBSTRUCTION/LOWER URINARY TRACT SYMPTOMS: Status: ACTIVE | Noted: 2020-05-05

## 2020-05-05 PROBLEM — N13.8 BPH WITH OBSTRUCTION/LOWER URINARY TRACT SYMPTOMS: Status: ACTIVE | Noted: 2020-05-05

## 2020-05-05 LAB
ABO + RH BLD: NORMAL
ANION GAP SERPL CALC-SCNC: 7 MMOL/L (ref 7–16)
BLOOD GROUP ANTIBODIES SERPL: NORMAL
BUN SERPL-MCNC: 58 MG/DL (ref 8–23)
CALCIUM SERPL-MCNC: 9.6 MG/DL (ref 8.3–10.4)
CHLORIDE SERPL-SCNC: 117 MMOL/L (ref 98–107)
CO2 SERPL-SCNC: 19 MMOL/L (ref 21–32)
CREAT SERPL-MCNC: 2.59 MG/DL (ref 0.8–1.5)
ERYTHROCYTE [DISTWIDTH] IN BLOOD BY AUTOMATED COUNT: 12.5 % (ref 11.9–14.6)
GLUCOSE SERPL-MCNC: 89 MG/DL (ref 65–100)
HCT VFR BLD AUTO: 37.4 % (ref 41.1–50.3)
HGB BLD-MCNC: 12.7 G/DL (ref 13.6–17.2)
MCH RBC QN AUTO: 31 PG (ref 26.1–32.9)
MCHC RBC AUTO-ENTMCNC: 34 G/DL (ref 31.4–35)
MCV RBC AUTO: 91.2 FL (ref 79.6–97.8)
NRBC # BLD: 0 K/UL (ref 0–0.2)
PLATELET # BLD AUTO: 249 K/UL (ref 150–450)
PMV BLD AUTO: 8.9 FL (ref 9.4–12.3)
POTASSIUM SERPL-SCNC: 5.4 MMOL/L (ref 3.5–5.1)
RBC # BLD AUTO: 4.1 M/UL (ref 4.23–5.6)
SODIUM SERPL-SCNC: 143 MMOL/L (ref 136–145)
SPECIMEN EXP DATE BLD: NORMAL
WBC # BLD AUTO: 6.2 K/UL (ref 4.3–11.1)

## 2020-05-05 PROCEDURE — 76060000033 HC ANESTHESIA 1 TO 1.5 HR: Performed by: UROLOGY

## 2020-05-05 PROCEDURE — 74011250636 HC RX REV CODE- 250/636: Performed by: ANESTHESIOLOGY

## 2020-05-05 PROCEDURE — 77030019927 HC TBNG IRR CYSTO BAXT -A: Performed by: UROLOGY

## 2020-05-05 PROCEDURE — 88305 TISSUE EXAM BY PATHOLOGIST: CPT

## 2020-05-05 PROCEDURE — 74011000258 HC RX REV CODE- 258: Performed by: UROLOGY

## 2020-05-05 PROCEDURE — 77030018830 HC SOL IRR GLYC ICUM-A: Performed by: UROLOGY

## 2020-05-05 PROCEDURE — 76010000161 HC OR TIME 1 TO 1.5 HR INTENSV-TIER 1: Performed by: UROLOGY

## 2020-05-05 PROCEDURE — 76210000006 HC OR PH I REC 0.5 TO 1 HR: Performed by: UROLOGY

## 2020-05-05 PROCEDURE — 77030040922 HC BLNKT HYPOTHRM STRY -A: Performed by: ANESTHESIOLOGY

## 2020-05-05 PROCEDURE — 77030040831 HC BAG URINE DRNG MDII -A: Performed by: UROLOGY

## 2020-05-05 PROCEDURE — 74011000250 HC RX REV CODE- 250: Performed by: NURSE ANESTHETIST, CERTIFIED REGISTERED

## 2020-05-05 PROCEDURE — 77030018836 HC SOL IRR NACL ICUM -A

## 2020-05-05 PROCEDURE — 77030027138 HC INCENT SPIROMETER -A

## 2020-05-05 PROCEDURE — 74011250636 HC RX REV CODE- 250/636: Performed by: UROLOGY

## 2020-05-05 PROCEDURE — 77030005546 HC CATH URETH FOL 3W BARD -A: Performed by: UROLOGY

## 2020-05-05 PROCEDURE — 99218 HC RM OBSERVATION: CPT

## 2020-05-05 PROCEDURE — 77030018846 HC SOL IRR STRL H20 ICUM -A: Performed by: UROLOGY

## 2020-05-05 PROCEDURE — 85027 COMPLETE CBC AUTOMATED: CPT

## 2020-05-05 PROCEDURE — 74011250636 HC RX REV CODE- 250/636: Performed by: NURSE ANESTHETIST, CERTIFIED REGISTERED

## 2020-05-05 PROCEDURE — 77030010509 HC AIRWY LMA MSK TELE -A: Performed by: ANESTHESIOLOGY

## 2020-05-05 PROCEDURE — 80048 BASIC METABOLIC PNL TOTAL CA: CPT

## 2020-05-05 PROCEDURE — 74011250637 HC RX REV CODE- 250/637: Performed by: UROLOGY

## 2020-05-05 PROCEDURE — 86900 BLOOD TYPING SEROLOGIC ABO: CPT

## 2020-05-05 PROCEDURE — 77030040361 HC SLV COMPR DVT MDII -B: Performed by: UROLOGY

## 2020-05-05 RX ORDER — LORATADINE 10 MG/1
5 TABLET ORAL DAILY
Status: DISCONTINUED | OUTPATIENT
Start: 2020-05-06 | End: 2020-05-07 | Stop reason: HOSPADM

## 2020-05-05 RX ORDER — DIPHENHYDRAMINE HYDROCHLORIDE 50 MG/ML
12.5 INJECTION, SOLUTION INTRAMUSCULAR; INTRAVENOUS
Status: DISCONTINUED | OUTPATIENT
Start: 2020-05-05 | End: 2020-05-05 | Stop reason: HOSPADM

## 2020-05-05 RX ORDER — SODIUM CHLORIDE 0.9 % (FLUSH) 0.9 %
5-40 SYRINGE (ML) INJECTION EVERY 8 HOURS
Status: DISCONTINUED | OUTPATIENT
Start: 2020-05-05 | End: 2020-05-05 | Stop reason: HOSPADM

## 2020-05-05 RX ORDER — DEXTROSE, SODIUM CHLORIDE, SODIUM LACTATE, POTASSIUM CHLORIDE, AND CALCIUM CHLORIDE 5; .6; .31; .03; .02 G/100ML; G/100ML; G/100ML; G/100ML; G/100ML
150 INJECTION, SOLUTION INTRAVENOUS CONTINUOUS
Status: DISCONTINUED | OUTPATIENT
Start: 2020-05-05 | End: 2020-05-07

## 2020-05-05 RX ORDER — ACETAMINOPHEN 500 MG
1000 TABLET ORAL ONCE
Status: DISCONTINUED | OUTPATIENT
Start: 2020-05-05 | End: 2020-05-05 | Stop reason: HOSPADM

## 2020-05-05 RX ORDER — ONDANSETRON 2 MG/ML
4 INJECTION INTRAMUSCULAR; INTRAVENOUS
Status: DISCONTINUED | OUTPATIENT
Start: 2020-05-05 | End: 2020-05-07 | Stop reason: HOSPADM

## 2020-05-05 RX ORDER — PANTOPRAZOLE SODIUM 40 MG/1
40 TABLET, DELAYED RELEASE ORAL
Status: DISCONTINUED | OUTPATIENT
Start: 2020-05-06 | End: 2020-05-07 | Stop reason: HOSPADM

## 2020-05-05 RX ORDER — NALOXONE HYDROCHLORIDE 0.4 MG/ML
0.2 INJECTION, SOLUTION INTRAMUSCULAR; INTRAVENOUS; SUBCUTANEOUS AS NEEDED
Status: DISCONTINUED | OUTPATIENT
Start: 2020-05-05 | End: 2020-05-05 | Stop reason: HOSPADM

## 2020-05-05 RX ORDER — DOCUSATE SODIUM 100 MG/1
100 CAPSULE, LIQUID FILLED ORAL 2 TIMES DAILY
Status: DISCONTINUED | OUTPATIENT
Start: 2020-05-05 | End: 2020-05-07 | Stop reason: HOSPADM

## 2020-05-05 RX ORDER — SODIUM CHLORIDE 0.9 % (FLUSH) 0.9 %
5-40 SYRINGE (ML) INJECTION AS NEEDED
Status: DISCONTINUED | OUTPATIENT
Start: 2020-05-05 | End: 2020-05-07 | Stop reason: HOSPADM

## 2020-05-05 RX ORDER — GENTAMICIN SULFATE 80 MG/100ML
80 INJECTION, SOLUTION INTRAVENOUS
Status: DISCONTINUED | OUTPATIENT
Start: 2020-05-05 | End: 2020-05-05

## 2020-05-05 RX ORDER — DIPHENHYDRAMINE HCL 25 MG
25 CAPSULE ORAL
Status: DISCONTINUED | OUTPATIENT
Start: 2020-05-05 | End: 2020-05-07 | Stop reason: HOSPADM

## 2020-05-05 RX ORDER — ACETAMINOPHEN 325 MG/1
650 TABLET ORAL
Status: DISCONTINUED | OUTPATIENT
Start: 2020-05-05 | End: 2020-05-07 | Stop reason: HOSPADM

## 2020-05-05 RX ORDER — SODIUM CHLORIDE 0.9 % (FLUSH) 0.9 %
5-40 SYRINGE (ML) INJECTION AS NEEDED
Status: DISCONTINUED | OUTPATIENT
Start: 2020-05-05 | End: 2020-05-05 | Stop reason: HOSPADM

## 2020-05-05 RX ORDER — SODIUM CHLORIDE, SODIUM LACTATE, POTASSIUM CHLORIDE, CALCIUM CHLORIDE 600; 310; 30; 20 MG/100ML; MG/100ML; MG/100ML; MG/100ML
100 INJECTION, SOLUTION INTRAVENOUS CONTINUOUS
Status: DISCONTINUED | OUTPATIENT
Start: 2020-05-05 | End: 2020-05-05 | Stop reason: HOSPADM

## 2020-05-05 RX ORDER — LIDOCAINE HYDROCHLORIDE 10 MG/ML
0.1 INJECTION INFILTRATION; PERINEURAL AS NEEDED
Status: DISCONTINUED | OUTPATIENT
Start: 2020-05-05 | End: 2020-05-05 | Stop reason: HOSPADM

## 2020-05-05 RX ORDER — OXYCODONE HYDROCHLORIDE 5 MG/1
5 TABLET ORAL
Status: DISCONTINUED | OUTPATIENT
Start: 2020-05-05 | End: 2020-05-05 | Stop reason: HOSPADM

## 2020-05-05 RX ORDER — FLUMAZENIL 0.1 MG/ML
0.2 INJECTION INTRAVENOUS
Status: DISCONTINUED | OUTPATIENT
Start: 2020-05-05 | End: 2020-05-05 | Stop reason: HOSPADM

## 2020-05-05 RX ORDER — GENTAMICIN SULFATE 80 MG/100ML
80 INJECTION, SOLUTION INTRAVENOUS
Status: COMPLETED | OUTPATIENT
Start: 2020-05-05 | End: 2020-05-05

## 2020-05-05 RX ORDER — HYDROMORPHONE HYDROCHLORIDE 2 MG/ML
0.5 INJECTION, SOLUTION INTRAMUSCULAR; INTRAVENOUS; SUBCUTANEOUS
Status: DISCONTINUED | OUTPATIENT
Start: 2020-05-05 | End: 2020-05-05 | Stop reason: HOSPADM

## 2020-05-05 RX ORDER — MORPHINE SULFATE 2 MG/ML
2 INJECTION, SOLUTION INTRAMUSCULAR; INTRAVENOUS
Status: DISCONTINUED | OUTPATIENT
Start: 2020-05-05 | End: 2020-05-07 | Stop reason: HOSPADM

## 2020-05-05 RX ORDER — MIDAZOLAM HYDROCHLORIDE 1 MG/ML
2 INJECTION, SOLUTION INTRAMUSCULAR; INTRAVENOUS
Status: COMPLETED | OUTPATIENT
Start: 2020-05-05 | End: 2020-05-05

## 2020-05-05 RX ORDER — ROSUVASTATIN CALCIUM 20 MG/1
40 TABLET, COATED ORAL
Status: DISCONTINUED | OUTPATIENT
Start: 2020-05-05 | End: 2020-05-07 | Stop reason: HOSPADM

## 2020-05-05 RX ORDER — FENTANYL CITRATE 50 UG/ML
INJECTION, SOLUTION INTRAMUSCULAR; INTRAVENOUS AS NEEDED
Status: DISCONTINUED | OUTPATIENT
Start: 2020-05-05 | End: 2020-05-05 | Stop reason: HOSPADM

## 2020-05-05 RX ORDER — FENTANYL CITRATE 50 UG/ML
100 INJECTION, SOLUTION INTRAMUSCULAR; INTRAVENOUS ONCE
Status: DISCONTINUED | OUTPATIENT
Start: 2020-05-05 | End: 2020-05-05 | Stop reason: HOSPADM

## 2020-05-05 RX ORDER — OXYCODONE HYDROCHLORIDE 5 MG/1
10 TABLET ORAL
Status: DISCONTINUED | OUTPATIENT
Start: 2020-05-05 | End: 2020-05-05 | Stop reason: HOSPADM

## 2020-05-05 RX ORDER — OXYCODONE AND ACETAMINOPHEN 7.5; 325 MG/1; MG/1
1 TABLET ORAL
Status: DISCONTINUED | OUTPATIENT
Start: 2020-05-05 | End: 2020-05-07 | Stop reason: HOSPADM

## 2020-05-05 RX ORDER — LISINOPRIL 5 MG/1
10 TABLET ORAL DAILY
Status: DISCONTINUED | OUTPATIENT
Start: 2020-05-06 | End: 2020-05-07 | Stop reason: HOSPADM

## 2020-05-05 RX ORDER — MIDAZOLAM HYDROCHLORIDE 1 MG/ML
2 INJECTION, SOLUTION INTRAMUSCULAR; INTRAVENOUS ONCE
Status: DISCONTINUED | OUTPATIENT
Start: 2020-05-05 | End: 2020-05-05 | Stop reason: HOSPADM

## 2020-05-05 RX ORDER — SODIUM CHLORIDE 0.9 % (FLUSH) 0.9 %
5-40 SYRINGE (ML) INJECTION EVERY 8 HOURS
Status: DISCONTINUED | OUTPATIENT
Start: 2020-05-05 | End: 2020-05-07 | Stop reason: HOSPADM

## 2020-05-05 RX ORDER — GENTAMICIN SULFATE 40 MG/ML
160 INJECTION, SOLUTION INTRAMUSCULAR; INTRAVENOUS
Status: DISCONTINUED | OUTPATIENT
Start: 2020-05-05 | End: 2020-05-05

## 2020-05-05 RX ORDER — FINASTERIDE 5 MG/1
5 TABLET, FILM COATED ORAL DAILY
Status: DISCONTINUED | OUTPATIENT
Start: 2020-05-06 | End: 2020-05-07 | Stop reason: HOSPADM

## 2020-05-05 RX ORDER — LIDOCAINE HYDROCHLORIDE 20 MG/ML
INJECTION, SOLUTION EPIDURAL; INFILTRATION; INTRACAUDAL; PERINEURAL AS NEEDED
Status: DISCONTINUED | OUTPATIENT
Start: 2020-05-05 | End: 2020-05-05 | Stop reason: HOSPADM

## 2020-05-05 RX ORDER — PROPOFOL 10 MG/ML
INJECTION, EMULSION INTRAVENOUS AS NEEDED
Status: DISCONTINUED | OUTPATIENT
Start: 2020-05-05 | End: 2020-05-05 | Stop reason: HOSPADM

## 2020-05-05 RX ORDER — TAMSULOSIN HYDROCHLORIDE 0.4 MG/1
0.4 CAPSULE ORAL
Status: DISCONTINUED | OUTPATIENT
Start: 2020-05-05 | End: 2020-05-07 | Stop reason: HOSPADM

## 2020-05-05 RX ADMIN — Medication 10 ML: at 17:09

## 2020-05-05 RX ADMIN — GENTAMICIN SULFATE 80 MG: 80 INJECTION, SOLUTION INTRAVENOUS at 12:32

## 2020-05-05 RX ADMIN — PROPOFOL 200 MG: 10 INJECTION, EMULSION INTRAVENOUS at 13:19

## 2020-05-05 RX ADMIN — MIDAZOLAM HYDROCHLORIDE 2 MG: 2 INJECTION, SOLUTION INTRAMUSCULAR; INTRAVENOUS at 12:42

## 2020-05-05 RX ADMIN — GENTAMICIN SULFATE 80 MG: 80 INJECTION, SOLUTION INTRAVENOUS at 12:47

## 2020-05-05 RX ADMIN — PIPERACILLIN AND TAZOBACTAM 3.38 G: 3; .375 INJECTION, POWDER, FOR SOLUTION INTRAVENOUS at 13:18

## 2020-05-05 RX ADMIN — PIPERACILLIN AND TAZOBACTAM 3.38 G: 3; .375 INJECTION, POWDER, FOR SOLUTION INTRAVENOUS at 21:06

## 2020-05-05 RX ADMIN — LIDOCAINE HYDROCHLORIDE 80 MG: 20 INJECTION, SOLUTION EPIDURAL; INFILTRATION; INTRACAUDAL; PERINEURAL at 13:19

## 2020-05-05 RX ADMIN — ROSUVASTATIN 40 MG: 20 TABLET, FILM COATED ORAL at 21:05

## 2020-05-05 RX ADMIN — SODIUM CHLORIDE, SODIUM LACTATE, POTASSIUM CHLORIDE, CALCIUM CHLORIDE, AND DEXTROSE MONOHYDRATE 150 ML/HR: 600; 310; 30; 20; 5 INJECTION, SOLUTION INTRAVENOUS at 17:09

## 2020-05-05 RX ADMIN — DOCUSATE SODIUM 100 MG: 100 CAPSULE, LIQUID FILLED ORAL at 17:48

## 2020-05-05 RX ADMIN — SODIUM CHLORIDE, SODIUM LACTATE, POTASSIUM CHLORIDE, AND CALCIUM CHLORIDE 100 ML/HR: 600; 310; 30; 20 INJECTION, SOLUTION INTRAVENOUS at 12:10

## 2020-05-05 RX ADMIN — TAMSULOSIN HYDROCHLORIDE 0.4 MG: 0.4 CAPSULE ORAL at 21:05

## 2020-05-05 RX ADMIN — FENTANYL CITRATE 100 MCG: 50 INJECTION INTRAMUSCULAR; INTRAVENOUS at 13:33

## 2020-05-05 NOTE — ANESTHESIA PREPROCEDURE EVALUATION
Anesthetic History   No history of anesthetic complications            Review of Systems / Medical History  Patient summary reviewed, nursing notes reviewed and pertinent labs reviewed    Pulmonary  Within defined limits                 Neuro/Psych   Within defined limits           Cardiovascular    Hypertension: well controlled          Hyperlipidemia    Exercise tolerance: >4 METS     GI/Hepatic/Renal     GERD: well controlled           Endo/Other        Arthritis     Other Findings              Physical Exam    Airway  Mallampati: II  TM Distance: 4 - 6 cm  Neck ROM: normal range of motion   Mouth opening: Normal     Cardiovascular  Regular rate and rhythm,  S1 and S2 normal,  no murmur, click, rub, or gallop  Rhythm: regular  Rate: normal      Pertinent negatives: No murmur   Dental  No notable dental hx       Pulmonary  Breath sounds clear to auscultation               Abdominal         Other Findings            Anesthetic Plan    ASA: 2  Anesthesia type: general          Induction: Intravenous  Anesthetic plan and risks discussed with: Patient

## 2020-05-05 NOTE — ANESTHESIA POSTPROCEDURE EVALUATION
Procedure(s):  CYSTOSCOPY TRANSURETHRAL RESECTION OF PROSTATE.    general    Anesthesia Post Evaluation      Multimodal analgesia: multimodal analgesia used between 6 hours prior to anesthesia start to PACU discharge  Patient location during evaluation: PACU  Patient participation: complete - patient participated  Level of consciousness: awake and alert  Pain management: adequate  Airway patency: patent  Anesthetic complications: no  Cardiovascular status: acceptable and hemodynamically stable  Respiratory status: acceptable  Hydration status: acceptable        Vitals Value Taken Time   /67 5/5/2020  4:20 PM   Temp 36.7 °C (98 °F) 5/5/2020  3:05 PM   Pulse 82 5/5/2020  4:20 PM   Resp 18 5/5/2020  3:45 PM   SpO2 100 % 5/5/2020  4:20 PM   Vitals shown include unvalidated device data.

## 2020-05-05 NOTE — PERIOP NOTES
TRANSFER - OUT REPORT:    Verbal report given to Lexi SAVAGE on Elza Machado  being transferred to [phone] for routine post - op       Report consisted of patients Situation, Background, Assessment and   Recommendations(SBAR). Information from the following report(s) SBAR, OR Summary, Procedure Summary, Intake/Output and MAR was reviewed with the receiving nurse. Lines:   Peripheral IV 05/05/20 Left Wrist (Active)   Site Assessment Clean, dry, & intact 5/5/2020  3:05 PM   Phlebitis Assessment 0 5/5/2020  3:05 PM   Infiltration Assessment 0 5/5/2020  3:05 PM   Dressing Status Clean, dry, & intact; Occlusive 5/5/2020  3:05 PM   Dressing Type Transparent;Tape 5/5/2020  3:05 PM   Hub Color/Line Status Green;Patent 5/5/2020  3:05 PM   Alcohol Cap Used No 5/5/2020  3:05 PM        Opportunity for questions and clarification was provided. Patient transported with:   O2 @ 2 liters    VTE prophylaxis orders have been written for Elza Machado. Patient and family given floor number and nurses name. Family updated re: pt status after security code verified.

## 2020-05-05 NOTE — PROGRESS NOTES
05/05/20 1652   Dual Skin Pressure Injury Assessment   Dual Skin Pressure Injury Assessment WDL   Second Care Provider (Based on [address] [place] [address]) Brandee Tavarez   pt has no noted skin breakdown on sacrum or heels. Skin is intact. Barcenas with CBI flowing well.

## 2020-05-05 NOTE — PROGRESS NOTES
Spiritual Care visit. Initial Visit, Pre Surgery Consult. Visit and prayer before patient goes to surgery.     Visit by Merilynn Skiff, M.Ed., Th.B. ,Staff

## 2020-05-05 NOTE — OP NOTES
[address] [place] [address]  OPERATIVE REPORT    Name:  Sintia Sepulveda  MR#:  [id number]  :  1950  ACCOUNT #:  [de-identified]  DATE OF SERVICE:  2020    PREOPERATIVE DIAGNOSES:  Benign prostatic hypertrophy, urinary retention, recent history of bladder tumor, and resistant Escherichia coli urinary tract infection. POSTOPERATIVE DIAGNOSES:  Benign prostatic hypertrophy, urinary retention, recent history of bladder tumor, and resistant Escherichia coli urinary tract infection. PROCEDURE PERFORMED:  Transurethral resection of the prostate. SURGEON:  Pedro Luis Longoria MD    ASSISTANT:  None. ANESTHESIA:  General.    COMPLICATIONS:  No complications. SPECIMENS REMOVED:  Prostate sent to the lab. IMPLANTS:      ESTIMATED BLOOD LOSS:  Less than 50 mL. DRAINS:  No drains were left. CATHETER:  A 24-Slovak three-way Barcenas catheter was placed. FINDINGS:  Per dictation. PROCEDURE INDICATION:  A 43-year-old male with recurrent UTIs, bladder tumor, epididymitis, history of stones, who underwent a transurethral resection of the prostate today because of urinary retention. PROCEDURE:  He received preop antibiotics. He was taken to the operating suite, underwent general anesthesia, placed in the dorsal lithotomy position, prepped with Betadine and draped in appropriate manner. A 28-Slovak Ferd Nathan sound was used to calibrate the urethra. There was some mild meatal stenosis. A 28-Slovak resectoscope was passed per urethra in the bladder. The patient had a huge median lobe of the prostate obstructing. This was resected, and then going clockwise and counterclockwise, the prostate tissue was resected until the fossa was open. The patient had good hemostasis. The pieces of the prostate were irrigated from the bladder. The site of the previous bladder tumor was again cauterized. It was healing and had some eschar. The patient tolerated the procedure well.   A 24-Slovak three-way Barcenas catheter was inserted and the prostate pieces were sent to the lab for pathologic analysis. The patient tolerated the procedure well, was sent to recovery in stable condition.       Gayle Bailey MD      [id number]  D:  05/05/2020 14:41  T:  05/05/2020 16:13  JOB #:  [id number]

## 2020-05-06 LAB
ANION GAP SERPL CALC-SCNC: 7 MMOL/L (ref 7–16)
BUN SERPL-MCNC: 36 MG/DL (ref 8–23)
CALCIUM SERPL-MCNC: 8.2 MG/DL (ref 8.3–10.4)
CHLORIDE SERPL-SCNC: 112 MMOL/L (ref 98–107)
CO2 SERPL-SCNC: 20 MMOL/L (ref 21–32)
CREAT SERPL-MCNC: 2.31 MG/DL (ref 0.8–1.5)
GLUCOSE SERPL-MCNC: 128 MG/DL (ref 65–100)
POTASSIUM SERPL-SCNC: 4.5 MMOL/L (ref 3.5–5.1)
SODIUM SERPL-SCNC: 139 MMOL/L (ref 136–145)

## 2020-05-06 PROCEDURE — 74011000258 HC RX REV CODE- 258: Performed by: UROLOGY

## 2020-05-06 PROCEDURE — 94760 N-INVAS EAR/PLS OXIMETRY 1: CPT

## 2020-05-06 PROCEDURE — 80048 BASIC METABOLIC PNL TOTAL CA: CPT

## 2020-05-06 PROCEDURE — 74011250637 HC RX REV CODE- 250/637: Performed by: UROLOGY

## 2020-05-06 PROCEDURE — 36415 COLL VENOUS BLD VENIPUNCTURE: CPT

## 2020-05-06 PROCEDURE — 77030018836 HC SOL IRR NACL ICUM -A

## 2020-05-06 PROCEDURE — 74011250636 HC RX REV CODE- 250/636: Performed by: UROLOGY

## 2020-05-06 RX ADMIN — PIPERACILLIN AND TAZOBACTAM 3.38 G: 3; .375 INJECTION, POWDER, FOR SOLUTION INTRAVENOUS at 12:00

## 2020-05-06 RX ADMIN — ROSUVASTATIN 40 MG: 20 TABLET, FILM COATED ORAL at 21:37

## 2020-05-06 RX ADMIN — PANTOPRAZOLE SODIUM 40 MG: 40 TABLET, DELAYED RELEASE ORAL at 06:41

## 2020-05-06 RX ADMIN — PIPERACILLIN AND TAZOBACTAM 3.38 G: 3; .375 INJECTION, POWDER, FOR SOLUTION INTRAVENOUS at 04:13

## 2020-05-06 RX ADMIN — Medication 10 ML: at 21:37

## 2020-05-06 RX ADMIN — SODIUM CHLORIDE, SODIUM LACTATE, POTASSIUM CHLORIDE, CALCIUM CHLORIDE, AND DEXTROSE MONOHYDRATE 150 ML/HR: 600; 310; 30; 20; 5 INJECTION, SOLUTION INTRAVENOUS at 00:33

## 2020-05-06 RX ADMIN — FINASTERIDE 5 MG: 5 TABLET, FILM COATED ORAL at 08:33

## 2020-05-06 RX ADMIN — LORATADINE 5 MG: 10 TABLET ORAL at 08:34

## 2020-05-06 RX ADMIN — PIPERACILLIN AND TAZOBACTAM 3.38 G: 3; .375 INJECTION, POWDER, FOR SOLUTION INTRAVENOUS at 20:00

## 2020-05-06 RX ADMIN — LISINOPRIL 5 MG: 5 TABLET ORAL at 08:32

## 2020-05-06 RX ADMIN — TAMSULOSIN HYDROCHLORIDE 0.4 MG: 0.4 CAPSULE ORAL at 21:37

## 2020-05-06 NOTE — PROGRESS NOTES
Pt is alert and oriented. Resting in bed, watching tv. Pt denied current pain/discomfort. Pt continues with continuous bladder irrigation. IV is clean, dry, intact and infusing. Call light at bedside within reach. Continue to monitor and change irrigation as needed.

## 2020-05-06 NOTE — PROGRESS NOTES
Admit Date: 5/5/2020    Subjective:     Em Streeter is sitting in the chair. He is doing well, no complaints. He has ambulated in the halls. Urine is clear with slow CBI.       Objective:     Patient Vitals for the past 8 hrs:   BP Temp Pulse Resp SpO2   05/06/20 0729 103/59 97.8 °F (36.6 °C) 68 18 95 %   05/06/20 0452 106/61 99.9 °F (37.7 °C) 76 18 95 %     05/06 0701 - 05/06 1900  In: -   Out: 3250 [Urine:3250]  05/04 1901 - 05/06 0700  In: 11603 [P.O.:240]  Out: 10936 [Urine:75467]    Physical Exam:  GENERAL: alert, cooperative, no distress  LUNG: clear to auscultation bilaterally  HEART: regular rate and rhythm, S1, S2   ABDOMEN: soft, non-tender  NEUROLOGIC: AOx3    Data Review   Recent Results (from the past 24 hour(s))   TYPE & SCREEN    Collection Time: 05/05/20 11:57 AM   Result Value Ref Range    Crossmatch Expiration 05/08/2020     ABO/Rh(D) A POSITIVE     Antibody screen NEG    METABOLIC PANEL, BASIC    Collection Time: 05/05/20 12:09 PM   Result Value Ref Range    Sodium 143 136 - 145 mmol/L    Potassium 5.4 (H) 3.5 - 5.1 mmol/L    Chloride 117 (H) 98 - 107 mmol/L    CO2 19 (L) 21 - 32 mmol/L    Anion gap 7 7 - 16 mmol/L    Glucose 89 65 - 100 mg/dL    BUN 58 (H) 8 - 23 MG/DL    Creatinine 2.59 (H) 0.8 - 1.5 MG/DL    GFR est AA 32 (L) >60 ml/min/1.73m2    GFR est non-AA 26 (L) >60 ml/min/1.73m2    Calcium 9.6 8.3 - 10.4 MG/DL   CBC W/O DIFF    Collection Time: 05/05/20 12:09 PM   Result Value Ref Range    WBC 6.2 4.3 - 11.1 K/uL    RBC 4.10 (L) 4.23 - 5.6 M/uL    HGB 12.7 (L) 13.6 - 17.2 g/dL    HCT 37.4 (L) 41.1 - 50.3 %    MCV 91.2 79.6 - 97.8 FL    MCH 31.0 26.1 - 32.9 PG    MCHC 34.0 31.4 - 35.0 g/dL    RDW 12.5 11.9 - 14.6 %    PLATELET 650 641 - 223 K/uL    MPV 8.9 (L) 9.4 - 12.3 FL    ABSOLUTE NRBC 0.00 0.0 - 0.2 K/uL   METABOLIC PANEL, BASIC    Collection Time: 05/06/20  7:28 AM   Result Value Ref Range    Sodium 139 136 - 145 mmol/L    Potassium 4.5 3.5 - 5.1 mmol/L    Chloride 112 (H) 98 - 107 mmol/L    CO2 20 (L) 21 - 32 mmol/L    Anion gap 7 7 - 16 mmol/L    Glucose 128 (H) 65 - 100 mg/dL    BUN 36 (H) 8 - 23 MG/DL    Creatinine 2.31 (H) 0.8 - 1.5 MG/DL    GFR est AA 36 (L) >60 ml/min/1.73m2    GFR est non-AA 30 (L) >60 ml/min/1.73m2    Calcium 8.2 (L) 8.3 - 10.4 MG/DL       Assessment:     Active Problems:    BPH with obstruction/lower urinary tract symptoms (5/5/2020)      POD 1:    POSTOPERATIVE DIAGNOSES:  Benign prostatic hypertrophy, urinary retention, recent history of bladder tumor, and resistant Escherichia coli urinary tract infection.     PROCEDURE PERFORMED:  Transurethral resection of the prostate. Cn 2.31 (down from 2.59)  Hgb 12.7  Afebrile, VSS    4/21- E. Coli UTI, sensitive to zosyn    Plan:     Continue CBI. Titrate drip to keep clear and wean as tolerated. Manually irrigate as needed.     Continue IVF. Continue IV zosyn.      Ambulate.     Continue incentive spirometer.      [id number] [facility], NP  [facility]

## 2020-05-06 NOTE — PROGRESS NOTES
Chart screened by  for discharge planning. No needs identified at this time. Please consult  if any new issues arise. Pt admitted with:Benign prostatic hypertrophy, urinary retention, recent history of bladder tumor, and resistant Escherichia coli urinary tract infection.     Underwent:  Transurethral resection of the prostate on 5/5/20, continues with  CBI today. Will plan to dc home when medically cleared. CM will remain available as needed. Care Management Interventions  PCP Verified by CM:  Yes  Current Support Network: Lives with Spouse, Own Home  Confirm Follow Up Transport: Family  Discharge Location  Discharge Placement: Home

## 2020-05-07 VITALS
SYSTOLIC BLOOD PRESSURE: 124 MMHG | TEMPERATURE: 97.8 F | HEIGHT: 69 IN | WEIGHT: 171.2 LBS | OXYGEN SATURATION: 97 % | RESPIRATION RATE: 18 BRPM | DIASTOLIC BLOOD PRESSURE: 77 MMHG | BODY MASS INDEX: 25.36 KG/M2 | HEART RATE: 77 BPM

## 2020-05-07 LAB
ANION GAP SERPL CALC-SCNC: 9 MMOL/L (ref 7–16)
BUN SERPL-MCNC: 29 MG/DL (ref 8–23)
CALCIUM SERPL-MCNC: 8.6 MG/DL (ref 8.3–10.4)
CHLORIDE SERPL-SCNC: 112 MMOL/L (ref 98–107)
CO2 SERPL-SCNC: 22 MMOL/L (ref 21–32)
CREAT SERPL-MCNC: 2.41 MG/DL (ref 0.8–1.5)
GLUCOSE SERPL-MCNC: 95 MG/DL (ref 65–100)
POTASSIUM SERPL-SCNC: 4.2 MMOL/L (ref 3.5–5.1)
SODIUM SERPL-SCNC: 143 MMOL/L (ref 136–145)

## 2020-05-07 PROCEDURE — 74011250637 HC RX REV CODE- 250/637: Performed by: UROLOGY

## 2020-05-07 PROCEDURE — 36415 COLL VENOUS BLD VENIPUNCTURE: CPT

## 2020-05-07 PROCEDURE — 80048 BASIC METABOLIC PNL TOTAL CA: CPT

## 2020-05-07 PROCEDURE — 74011250636 HC RX REV CODE- 250/636: Performed by: UROLOGY

## 2020-05-07 PROCEDURE — 74011000258 HC RX REV CODE- 258: Performed by: UROLOGY

## 2020-05-07 RX ORDER — NITROFURANTOIN MACROCRYSTALS 50 MG/1
50 CAPSULE ORAL 2 TIMES DAILY
Qty: 14 CAP | Refills: 0 | Status: SHIPPED | OUTPATIENT
Start: 2020-05-07 | End: 2020-05-20

## 2020-05-07 RX ADMIN — PANTOPRAZOLE SODIUM 40 MG: 40 TABLET, DELAYED RELEASE ORAL at 05:35

## 2020-05-07 RX ADMIN — PIPERACILLIN AND TAZOBACTAM 3.38 G: 3; .375 INJECTION, POWDER, FOR SOLUTION INTRAVENOUS at 04:00

## 2020-05-07 RX ADMIN — Medication 10 ML: at 05:35

## 2020-05-07 RX ADMIN — FINASTERIDE 5 MG: 5 TABLET, FILM COATED ORAL at 08:57

## 2020-05-07 RX ADMIN — LISINOPRIL 10 MG: 5 TABLET ORAL at 08:57

## 2020-05-07 RX ADMIN — LORATADINE 5 MG: 10 TABLET ORAL at 08:57

## 2020-05-07 NOTE — PROGRESS NOTES
Patient alert and oriented x 4. Hourly rounds complete this shift. CBI continued at slow rate via gravity. Barcenas below bladder, patent and flowing via gravity. Urine is peach in color. Pt able to voice needs and concerns appropiately. All needs met at this time. Call light within reach. Bed low and locked. Will continue to monitor and report to oncoming RN.

## 2020-05-07 NOTE — DISCHARGE SUMMARY
Discharge Summary     Patient: Veronica Fuentes MRN: [id number]  SSN: [id number]    YOB: 1950  Age: 78 y.o. Sex: male      Allergies: Patient has no known allergies. Admit Date: 5/5/2020    Discharge Date: 5/7/2020     * Admission Diagnoses:  RLQ abdominal pain [R10.31]  Enlarged prostate [N40.0]  BPH with obstruction/lower urinary tract symptoms [N40.1, N13.8]     * Discharge Diagnoses:   Hospital Problems as of 5/7/2020 Date Reviewed: 1/27/2020          Codes Class Noted - Resolved POA    BPH with obstruction/lower urinary tract symptoms ICD-10-CM: N40.1, N13.8  ICD-9-CM: 600.01, 599.69  5/5/2020 - Present Unknown               * Procedures for this admission:   Procedure(s):  CYSTOSCOPY TRANSURETHRAL RESECTION OF PROSTATE      * Disposition: Home    * Discharged Condition: improved    * Hospital Course:      Mr. Epi Pinzon is a 56-year-old male with hx of BPH, urinary retention, recent hx of bladder tumor and resistant E- Coli UTI, he is s/p cystoscopy TURP. Pathology shows BPH. Cn 2.59 initially, today it is 2.41. He will push fluids. UTI from 4/21 growing E. Coli, he has been receiving IV zosyn while here. He is afebrile, VSS. CBI at slow drip, urine is clear on POD 2 with CBI weaned. Barcenas removed. He has voided consistently. Urine is clear. D/C home today and RTO in 1 week for f/u visit with Dr. Jeannette Au for PVR and BMP check. D/C with macrodantin x 1 week.      Patient Active Problem List   Diagnosis Code    BPH without obstruction/lower urinary tract symptoms N40.0    Urinary frequency R35.0    Right inguinal hernia K40.90    Bilateral renal stones N20.0    Bladder calculi N21.0    Malignant neoplasm of lateral wall of urinary bladder (HCC) C67.2    Bladder cancer (HCC) C67.9    BPH with obstruction/lower urinary tract symptoms N40.1, N13.8                 Discharge Medications:   Current Discharge Medication List      START taking these medications    Details nitrofurantoin (MACRODANTIN) 50 mg capsule Take 1 Cap by mouth two (2) times a day. Qty: 14 Cap, Refills: 0         CONTINUE these medications which have NOT CHANGED    Details   tamsulosin (FLOMAX) 0.4 mg capsule Take 0.4 mg by mouth nightly. lisinopriL (PRINIVIL, ZESTRIL) 10 mg tablet Take  by mouth daily. finasteride (PROSCAR) 5 mg tablet Take 1 Tab by mouth daily. Qty: 60 Tab, Refills: 3      fluticasone propionate (FLONASE NA) by Nasal route. rosuvastatin (CRESTOR) 40 mg tablet Take 40 mg by mouth nightly. levocetirizine (XYZAL) 5 mg tablet daily. esomeprazole (NEXIUM) 40 mg capsule Take 40 mg by mouth every morning. Lactobac no.41/Bifidobact no.7 (PROBIOTIC-10 PO) Take  by mouth. cholecalciferol, vitamin D3, (VITAMIN D3 PO) Take  by mouth. Berb Devine/herbal complex no.18 (BERBERINE-HERBAL COMB NO.18 PO) Take  by mouth. beta-carotene,A,-vits C,E/mins (OCUVITE PO) Take  by mouth.      multivitamin (ONE A DAY) tablet Take 1 Tab by mouth daily. STOP taking these medications       celecoxib (CELEBREX) 200 mg capsule Comments:   Reason for Stopping:                * Follow-up Care/Patient Instructions: Activity:    No heavy lifting, pushing, pulling, avoid straining, avoid yard work, cutting grass, strenuous activity, etc., until at least 3-4 weeks. Avoid driving while taking pain medication. Recommend colace daily. Pt aware that he may have clear yellow urine and blood-tinged urine alternating for several weeks after surgery. Diet: Regular Diet  Wound Care: None needed    Follow-up Information     Follow up With Specialties Details Why Contact Info    Roshan Helm MD Internal Medicine   [address]  [phone]      Yeimy Lockwood MD Urology  Office will call patient with appointment date and time.  [address]  [address]

## 2020-05-07 NOTE — DISCHARGE INSTRUCTIONS
DISCHARGE SUMMARY from Nurse    PATIENT INSTRUCTIONS:    After general anesthesia or intravenous sedation, for 24 hours or while taking prescription Narcotics:  · Limit your activities  · Do not drive and operate hazardous machinery  · Do not make important personal or business decisions  · Do  not drink alcoholic beverages  · If you have not urinated within 8 hours after discharge, please contact your surgeon on call. Report the following to your surgeon:  · Excessive pain, swelling, redness or odor of or around the surgical area  · Temperature over 100.5  · Nausea and vomiting lasting longer than 4 hours or if unable to take medications  · Any signs of decreased circulation or nerve impairment to extremity: change in color, persistent  numbness, tingling, coldness or increase pain  · Any questions    What to do at Home:  Recommended activity: Activity as tolerated, no lifting, pulling, pushing greater than 10lbs. Resume pre-hospital diet. If you experience any of the following symptoms increased pain, nausea/vomiting lasting longer than 4 hours, elevated temperature greater than 100.5, decreased urine output or bloody urine, please follow up with Dr. Lula Way. *  Please give a list of your current medications to your Primary Care Provider. *  Please update this list whenever your medications are discontinued, doses are      changed, or new medications (including over-the-counter products) are added. *  Please carry medication information at all times in case of emergency situations. These are general instructions for a healthy lifestyle:    No smoking/ No tobacco products/ Avoid exposure to second hand smoke  Surgeon General's Warning:  Quitting smoking now greatly reduces serious risk to your health.     Obesity, smoking, and sedentary lifestyle greatly increases your risk for illness    A healthy diet, regular physical exercise & weight monitoring are important for maintaining a healthy lifestyle    You may be retaining fluid if you have a history of heart failure or if you experience any of the following symptoms:  Weight gain of 3 pounds or more overnight or 5 pounds in a week, increased swelling in our hands or feet or shortness of breath while lying flat in bed. Please call your doctor as soon as you notice any of these symptoms; do not wait until your next office visit. The discharge information has been reviewed with the patient. The patient verbalized understanding. Discharge medications reviewed with the patient and appropriate educational materials and side effects teaching were provided.   ___________________________________________________________________________________________________________________________________

## 2020-05-07 NOTE — PROGRESS NOTES
Care Management Interventions  PCP Verified by CM: Yes  Current Support Network: Lives with Spouse, Own Home  Confirm Follow Up Transport: Family  Discharge Location  Discharge Placement: Home    Patient discharged home with no CM needs identified. Patient transported home with family. All milestones for discharge have been met.

## 2020-05-07 NOTE — PROGRESS NOTES
Admit Date: 5/5/2020    Subjective:     Christophe Troy has no complaints. Urine is clear and yellow. CBI weaned to very slow drip. No issues overnight. Objective:     Patient Vitals for the past 8 hrs:   BP Temp Pulse Resp SpO2   05/07/20 0758 113/65 96.9 °F (36.1 °C) 67 18 94 %   05/07/20 0538 117/69 97.9 °F (36.6 °C) 72 18 95 %   05/07/20 0144 137/77 97.9 °F (36.6 °C) 71 18 98 %     No intake/output data recorded. 05/05 1901 - 05/07 0700  In: 39929   Out: 66765 [Urine:65523]    Physical Exam:  GENERAL: alert, cooperative, no distress  LUNG: clear to auscultation bilaterally  HEART: regular rate and rhythm, S1, S2   ABDOMEN: soft, non-tender  NEUROLOGIC: AOx3    Data Review   Recent Results (from the past 24 hour(s))   METABOLIC PANEL, BASIC    Collection Time: 05/07/20  6:02 AM   Result Value Ref Range    Sodium 143 136 - 145 mmol/L    Potassium 4.2 3.5 - 5.1 mmol/L    Chloride 112 (H) 98 - 107 mmol/L    CO2 22 21 - 32 mmol/L    Anion gap 9 7 - 16 mmol/L    Glucose 95 65 - 100 mg/dL    BUN 29 (H) 8 - 23 MG/DL    Creatinine 2.41 (H) 0.8 - 1.5 MG/DL    GFR est AA 34 (L) >60 ml/min/1.73m2    GFR est non-AA 28 (L) >60 ml/min/1.73m2    Calcium 8.6 8.3 - 10.4 MG/DL       Assessment:     Active Problems:    BPH with obstruction/lower urinary tract symptoms (5/5/2020)       POD 2:     POSTOPERATIVE DIAGNOSES:  Benign prostatic hypertrophy, urinary retention, recent history of bladder tumor, and resistant Escherichia coli urinary tract infection.     PROCEDURE PERFORMED:  Transurethral resection of the prostate.     Cn 2.41    Afebrile, VSS     4/21- E. Coli UTI, sensitive to zosyn    Plan:     Remove gracia catheter. Monitor voiding. Saline-lock IV. Ambulate pt. Home when voiding well. RTO in 2-3 weeks for f/u appt, we will schedule this and notify pt of date/time. Home with PO abx.         Ryder Rogel, NP  [facility]

## 2020-05-07 NOTE — PROGRESS NOTES
Problem: Falls - Risk of  Goal: *Absence of Falls  Description: Document Sherman Talley Fall Risk and appropriate interventions in the flowsheet.   Outcome: Progressing Towards Goal  Note: Fall Risk Interventions:            Medication Interventions: Patient to call before getting OOB, Assess postural VS orthostatic hypotension, Teach patient to arise slowly    Elimination Interventions: Call light in reach, Patient to call for help with toileting needs              Problem: Patient Education: Go to Patient Education Activity  Goal: Patient/Family Education  Outcome: Progressing Towards Goal     Problem: Infection - Risk of, Urinary Catheter-Associated Urinary Tract Infection  Goal: *Absence of infection signs and symptoms  Outcome: Progressing Towards Goal     Problem: Patient Education: Go to Patient Education Activity  Goal: Patient/Family Education  Outcome: Progressing Towards Goal

## 2020-05-20 PROBLEM — R39.14 FEELING OF INCOMPLETE BLADDER EMPTYING: Status: ACTIVE | Noted: 2020-05-20

## 2020-05-20 PROBLEM — N17.9 ACUTE RENAL FAILURE (HCC): Status: ACTIVE | Noted: 2020-05-20

## 2020-06-16 PROBLEM — N45.1 EPIDIDYMITIS: Status: ACTIVE | Noted: 2020-06-16

## 2020-06-16 PROBLEM — N39.0 RECURRENT UTI: Status: ACTIVE | Noted: 2020-06-16

## 2021-01-15 PROCEDURE — 88112 CYTOPATH CELL ENHANCE TECH: CPT

## 2021-01-18 ENCOUNTER — HOSPITAL ENCOUNTER (OUTPATIENT)
Dept: LAB | Age: 71
Discharge: HOME | End: 2021-01-18

## 2021-01-18 NOTE — PROGRESS NOTES
Please call patient to let him know that no cancer cells are present. Keep follow up with me as scheduled.

## 2021-04-16 PROCEDURE — 88112 CYTOPATH CELL ENHANCE TECH: CPT

## 2021-04-19 ENCOUNTER — HOSPITAL ENCOUNTER (OUTPATIENT)
Dept: LAB | Age: 71
Discharge: HOME | End: 2021-04-19

## 2021-04-19 NOTE — PROGRESS NOTES
Mr. Holt November, your urine cytology shows NO cancer cells. We plan to keep your follow up as scheduled. Thanks!   Dr. Esau Courtney

## 2021-04-30 ENCOUNTER — HOSPITAL ENCOUNTER (OUTPATIENT)
Dept: CT IMAGING | Age: 71
Discharge: HOME | End: 2021-04-30
Attending: UROLOGY
Payer: MEDICARE

## 2021-04-30 DIAGNOSIS — C67.2 MALIGNANT NEOPLASM OF LATERAL WALL OF URINARY BLADDER (HCC): ICD-10-CM

## 2021-04-30 LAB — CREAT BLD-MCNC: 1.2 MG/DL (ref 0.8–1.5)

## 2021-04-30 PROCEDURE — 74011000636 HC RX REV CODE- 636: Performed by: UROLOGY

## 2021-04-30 PROCEDURE — 82565 ASSAY OF CREATININE: CPT

## 2021-04-30 PROCEDURE — 74178 CT ABD&PLV WO CNTR FLWD CNTR: CPT

## 2021-04-30 RX ORDER — SODIUM CHLORIDE 0.9 % (FLUSH) 0.9 %
10 SYRINGE (ML) INJECTION
Status: ACTIVE | OUTPATIENT
Start: 2021-04-30 | End: 2021-04-30

## 2021-04-30 RX ADMIN — IOPAMIDOL 100 ML: 755 INJECTION, SOLUTION INTRAVENOUS at 08:24

## 2021-04-30 NOTE — PROGRESS NOTES
Mr. Fair Basilio, your CT shows a small 3 mm R lower pole kidney stone but no concern for cancer recurrence. We do not need to treat the stone at this time unless it starts to cause you pain. I will plan to see you in the fall for your next scheduled follow up. Please let me know if you have any questions. Thanks!   Dr. Erika Bustos

## 2021-10-15 PROCEDURE — 88112 CYTOPATH CELL ENHANCE TECH: CPT

## 2021-10-18 ENCOUNTER — HOSPITAL ENCOUNTER (OUTPATIENT)
Dept: LAB | Age: 71
Discharge: HOME | End: 2021-10-18

## 2021-10-19 NOTE — PROGRESS NOTES
Mr. Salter , your urine cytology shows NO cancer cells. I will plan to see you as scheduled next year. Please let me know if you have any questions/concerns.      Rossy Garvin,  Dr. Cesar Gomez

## 2022-03-18 PROBLEM — N39.0 RECURRENT UTI: Status: ACTIVE | Noted: 2020-06-16

## 2022-03-19 PROBLEM — N21.0 BLADDER CALCULI: Status: ACTIVE | Noted: 2020-02-25

## 2022-03-19 PROBLEM — N40.1 BENIGN PROSTATIC HYPERPLASIA WITH URINARY OBSTRUCTION: Status: ACTIVE | Noted: 2020-05-05

## 2022-03-19 PROBLEM — C67.9 BLADDER CANCER (HCC): Status: ACTIVE | Noted: 2020-03-02

## 2022-03-19 PROBLEM — N13.8 BENIGN PROSTATIC HYPERPLASIA WITH URINARY OBSTRUCTION: Status: ACTIVE | Noted: 2020-05-05

## 2022-03-19 PROBLEM — C67.2 MALIGNANT NEOPLASM OF LATERAL WALL OF URINARY BLADDER (HCC): Status: ACTIVE | Noted: 2020-02-25

## 2022-03-19 PROBLEM — K40.90 RIGHT INGUINAL HERNIA: Status: ACTIVE | Noted: 2018-10-29

## 2022-03-19 PROBLEM — N20.0 BILATERAL RENAL STONES: Status: ACTIVE | Noted: 2020-02-25

## 2022-03-19 PROBLEM — N45.1 EPIDIDYMITIS: Status: ACTIVE | Noted: 2020-06-16

## 2022-03-20 PROBLEM — R39.14 FEELING OF INCOMPLETE BLADDER EMPTYING: Status: ACTIVE | Noted: 2020-05-20

## 2022-03-20 PROBLEM — N17.9 ACUTE RENAL FAILURE (HCC): Status: ACTIVE | Noted: 2020-05-20

## 2022-10-10 ENCOUNTER — NURSE ONLY (OUTPATIENT)
Dept: UROLOGY | Age: 72
End: 2022-10-10

## 2022-10-10 DIAGNOSIS — N13.8 BPH WITH OBSTRUCTION/LOWER URINARY TRACT SYMPTOMS: ICD-10-CM

## 2022-10-10 DIAGNOSIS — N40.1 BPH WITH OBSTRUCTION/LOWER URINARY TRACT SYMPTOMS: ICD-10-CM

## 2022-10-10 DIAGNOSIS — N13.8 BPH WITH OBSTRUCTION/LOWER URINARY TRACT SYMPTOMS: Primary | ICD-10-CM

## 2022-10-10 DIAGNOSIS — N40.1 BPH WITH OBSTRUCTION/LOWER URINARY TRACT SYMPTOMS: Primary | ICD-10-CM

## 2022-10-11 LAB — PSA SERPL-MCNC: 0.8 NG/ML

## 2022-10-17 ENCOUNTER — TELEPHONE (OUTPATIENT)
Dept: UROLOGY | Age: 72
End: 2022-10-17

## 2022-10-17 ENCOUNTER — PROCEDURE VISIT (OUTPATIENT)
Dept: UROLOGY | Age: 72
End: 2022-10-17
Payer: MEDICARE

## 2022-10-17 DIAGNOSIS — N40.1 BPH WITH OBSTRUCTION/LOWER URINARY TRACT SYMPTOMS: Primary | ICD-10-CM

## 2022-10-17 DIAGNOSIS — C67.2 MALIGNANT NEOPLASM OF LATERAL WALL OF BLADDER (HCC): ICD-10-CM

## 2022-10-17 DIAGNOSIS — N13.8 BPH WITH OBSTRUCTION/LOWER URINARY TRACT SYMPTOMS: Primary | ICD-10-CM

## 2022-10-17 LAB
BILIRUBIN, URINE, POC: NEGATIVE
BLOOD URINE, POC: NEGATIVE
GLUCOSE URINE, POC: NEGATIVE
KETONES, URINE, POC: NEGATIVE
LEUKOCYTE ESTERASE, URINE, POC: NEGATIVE
NITRITE, URINE, POC: NEGATIVE
PH, URINE, POC: 6 (ref 4.6–8)
PROTEIN,URINE, POC: NEGATIVE
SPECIFIC GRAVITY, URINE, POC: 1.01 (ref 1–1.03)
URINALYSIS CLARITY, POC: NORMAL
URINALYSIS COLOR, POC: NORMAL
UROBILINOGEN, POC: NORMAL

## 2022-10-17 PROCEDURE — 81003 URINALYSIS AUTO W/O SCOPE: CPT | Performed by: UROLOGY

## 2022-10-17 PROCEDURE — 52000 CYSTOURETHROSCOPY: CPT | Performed by: UROLOGY

## 2022-10-17 RX ORDER — LOSARTAN POTASSIUM 25 MG/1
25 TABLET ORAL DAILY
COMMUNITY
Start: 2021-08-04

## 2022-10-17 NOTE — PROGRESS NOTES
[facility]  [address]   [address]  [facility], [address]  [phone]    Kary Saenz  : 1950    HPI   67 y.o.  male who presents for cystoscopy for bladder cancer surveillance. He previously followed with Dr. Jeffrey Thomas and has a history of TURBT in 3/2020 for bladder cancer (low grade Ta, intermediate risk based on size). He then had a TURP for BPH/LUTS in 2020 with benign pathology and has since been voiding well. Today, he has no complaints of gross hematuria, LUTS, concern for recurrent tumor. CT hematuria protocol in 2021 showed a 3 mm R lower pole kidney stone that is asymptomatic. Lab Results   Component Value Date    PSA 0.8 10/10/2022         Past Medical History:   Diagnosis Date    Arthritis     feet and hands    Cancer ([facility] [address]     bladder    Enlarged prostate     GERD (gastroesophageal reflux disease)     controlled with nexium    Hypercholesterolemia     Hypertension     medication    Kidney stone     Right inguinal hernia      Past Surgical History:   Procedure Laterality Date    CERVICAL LAMINECTOMY      COLONOSCOPY      HERNIA REPAIR Right 2019    inguinal     TONSILLECTOMY      x2    UPPER GASTROINTESTINAL ENDOSCOPY      UROLOGICAL SURGERY      bladder tumor removal     Current Outpatient Medications   Medication Sig Dispense Refill    losartan (COZAAR) 25 MG tablet Take 25 mg by mouth daily      levocetirizine (XYZAL) 5 MG tablet daily      ondansetron (ZOFRAN-ODT) 4 MG disintegrating tablet Take 4 mg by mouth every 8 hours as needed      promethazine (PHENERGAN) 12.5 MG tablet Take 12.5 mg by mouth every 6 hours as needed      rosuvastatin (CRESTOR) 40 MG tablet Take 40 mg by mouth      sodium bicarbonate 650 MG tablet TK 1 T PO BID       No current facility-administered medications for this visit.      No Known Allergies  Social History     Socioeconomic History    Marital status:      Spouse name: Not on file    Number of children: Not on file    Years of education: Not on file    Highest education level: Not on file   Occupational History    Not on file   Tobacco Use    Smoking status: Former     Packs/day: 1.00     Types: Cigarettes     Quit date: 1973     Years since quittin.7    Smokeless tobacco: Never   Substance and Sexual Activity    Alcohol use: Yes     Alcohol/week: 3.0 standard drinks    Drug use: No    Sexual activity: Not on file   Other Topics Concern    Not on file   Social History Narrative    Not on file     Social Determinants of Health     Financial Resource Strain: Not on file   Food Insecurity: Not on file   Transportation Needs: Not on file   Physical Activity: Not on file   Stress: Not on file   Social Connections: Not on file   Intimate Partner Violence: Not on file   Housing Stability: Not on file     Family History   Problem Relation Age of Onset    Cancer Sister     Cancer Father     Hypertension Mother        There were no vitals taken for this visit. UA - Dipstick  Results for orders placed or performed in visit on 10/17/22   AMB POC URINALYSIS DIP STICK AUTO W/O MICRO   Result Value Ref Range    Color (UA POC)      Clarity (UA POC)      Glucose, Urine, POC Negative Negative    Bilirubin, Urine, POC Negative Negative    KETONES, Urine, POC Negative Negative    Specific Gravity, Urine, POC 1.010 1.001 - 1.035    Blood (UA POC) Negative Negative    pH, Urine, POC 6.0 4.6 - 8.0    Protein, Urine, POC Negative Negative    Urobilinogen, POC 0.2 mg/dL     Nitrite, Urine, POC Negative Negative    Leukocyte Esterase, Urine, POC Negative Negative       There were no vitals taken for this visit.      GENERAL: No acute distress, Awake, Alert, Oriented X 3, Gait normal  CARDIAC: regular rate and rhythm  CHEST AND LUNG: Easy work of breathing, clear to auscultation bilaterally, no cyanosis  ABDOMEN: soft, non tender, non-distended, positive bowel sounds, no organomegaly, no palpable masses, no guarding, no rebound tenderness  SKIN: No rash, no erythema, no lacerations or abrasions, no ecchymosis  NEUROLOGIC: cranial nerves 2-12 grossly intact       Cystoscopy Procedure:     Procedure Room # 1  Scope ID: [id number]  Assistant: margo      All risks, benefits and alternatives were again reviewed with patient and he is willing to proceed at this time. His genital area was prepped and draped and a sterile field applied. 2% lidocaine jelly was injected in the the urethra and allowed to dwell for several minutes. A flexible cystoscope was then inserted into the urethral meatus and advanced under direct vision. The anterior and posterior urethra appeared normal in appearance. The prostatic urethra was open without significant hypertrophy and large TURP defect. The bladder was systematically surveyed. +2 bladder trabeculations were seen. No mucosal abnormalities were seen. The ureteral orifices were seen in their normal orthotopic position. The cystoscope was then removed under direct vision. The patient tolerated the procedure well. Assessment and Plan    ICD-10-CM    1. BPH with obstruction/lower urinary tract symptoms  N40.1 CYSTOURETHROSCOPY    N13.8 AMB POC URINALYSIS DIP STICK AUTO W/O MICRO      2. Malignant neoplasm of lateral wall of bladder (HCC)  C67.2 CYSTOURETHROSCOPY     AMB POC URINALYSIS DIP STICK AUTO W/O MICRO        Bladder Cancer:   Cysto without recurrence. Cytology sent. Follow up 1 year with PSA prior    BPH/LUTS:   Doing well s/p TURP. Off all prostate meds. Follow up 1 year    Thony Rivera M.D.     [facility]  [address], [facility] [address] [facility]  Phone: [phone]  Fax: [phone]

## 2022-10-18 ENCOUNTER — TELEPHONE (OUTPATIENT)
Dept: UROLOGY | Age: 72
End: 2022-10-18

## 2022-10-18 DIAGNOSIS — N52.01 ERECTILE DYSFUNCTION DUE TO ARTERIAL INSUFFICIENCY: Primary | ICD-10-CM

## 2022-10-18 RX ORDER — SILDENAFIL CITRATE 20 MG/1
20 TABLET ORAL DAILY PRN
Qty: 50 TABLET | Refills: 3 | Status: SHIPPED | OUTPATIENT
Start: 2022-10-18

## 2022-10-18 NOTE — TELEPHONE ENCOUNTER
Patient called wanting sildenafil script sent to [phone]. Script called in. Thony Chicas M.D.     [facility]  [address]  Phone: [phone]  Fax: [phone]

## 2022-10-20 LAB
CYTOLOGY-NON GYN: NORMAL
SPECIMEN SOURCE: NORMAL

## 2022-10-21 NOTE — RESULT ENCOUNTER NOTE
Mr. True Postal your urine cytology shows no cancer cells. Please keep your follow up with me as scheduled.      Dr. Rgio Randolph

## 2023-10-09 ENCOUNTER — NURSE ONLY (OUTPATIENT)
Dept: UROLOGY | Age: 73
End: 2023-10-09

## 2023-10-09 DIAGNOSIS — N40.1 BPH WITH OBSTRUCTION/LOWER URINARY TRACT SYMPTOMS: ICD-10-CM

## 2023-10-09 DIAGNOSIS — N13.8 BPH WITH OBSTRUCTION/LOWER URINARY TRACT SYMPTOMS: ICD-10-CM

## 2023-10-09 LAB — PSA SERPL-MCNC: 0.5 NG/ML

## 2023-10-16 ENCOUNTER — PROCEDURE VISIT (OUTPATIENT)
Dept: UROLOGY | Age: 73
End: 2023-10-16
Payer: MEDICARE

## 2023-10-16 DIAGNOSIS — N40.1 BPH WITH OBSTRUCTION/LOWER URINARY TRACT SYMPTOMS: Primary | ICD-10-CM

## 2023-10-16 DIAGNOSIS — N13.8 BPH WITH OBSTRUCTION/LOWER URINARY TRACT SYMPTOMS: Primary | ICD-10-CM

## 2023-10-16 DIAGNOSIS — C67.2 MALIGNANT NEOPLASM OF LATERAL WALL OF BLADDER (HCC): ICD-10-CM

## 2023-10-16 LAB
BILIRUBIN, URINE, POC: NEGATIVE
BLOOD URINE, POC: NEGATIVE
GLUCOSE URINE, POC: NEGATIVE
KETONES, URINE, POC: NEGATIVE
LEUKOCYTE ESTERASE, URINE, POC: NEGATIVE
NITRITE, URINE, POC: NEGATIVE
PH, URINE, POC: 5.5 (ref 4.6–8)
PROTEIN,URINE, POC: NEGATIVE
SPECIFIC GRAVITY, URINE, POC: 1.02 (ref 1–1.03)
URINALYSIS CLARITY, POC: NORMAL
URINALYSIS COLOR, POC: NORMAL
UROBILINOGEN, POC: NORMAL

## 2023-10-16 PROCEDURE — 81003 URINALYSIS AUTO W/O SCOPE: CPT | Performed by: UROLOGY

## 2023-10-16 PROCEDURE — 52000 CYSTOURETHROSCOPY: CPT | Performed by: UROLOGY

## 2023-10-16 NOTE — PROGRESS NOTES
No current facility-administered medications for this visit. No Known Allergies  Social History     Socioeconomic History    Marital status:      Spouse name: Not on file    Number of children: Not on file    Years of education: Not on file    Highest education level: Not on file   Occupational History    Not on file   Tobacco Use    Smoking status: Former     Packs/day: 1     Types: Cigarettes     Quit date: 1973     Years since quittin.7    Smokeless tobacco: Never   Substance and Sexual Activity    Alcohol use: Yes     Alcohol/week: 3.0 standard drinks of alcohol    Drug use: No    Sexual activity: Not on file   Other Topics Concern    Not on file   Social History Narrative    Not on file     Social Determinants of Health     Financial Resource Strain: Not on file   Food Insecurity: Not on file   Transportation Needs: Not on file   Physical Activity: Not on file   Stress: Not on file   Social Connections: Not on file   Intimate Partner Violence: Not on file   Housing Stability: Not on file     Family History   Problem Relation Age of Onset    Cancer Sister     Cancer Father     Hypertension Mother        There were no vitals taken for this visit. UA - Dipstick  Results for orders placed or performed in visit on 10/16/23   AMB POC URINALYSIS DIP STICK AUTO W/O MICRO   Result Value Ref Range    Color (UA POC)      Clarity (UA POC)      Glucose, Urine, POC Negative Negative    Bilirubin, Urine, POC Negative Negative    KETONES, Urine, POC Negative Negative    Specific Gravity, Urine, POC 1.025 1.001 - 1.035    Blood (UA POC) Negative Negative    pH, Urine, POC 5.5 4.6 - 8.0    Protein, Urine, POC Negative Negative    Urobilinogen, POC 0.2 mg/dL     Nitrite, Urine, POC Negative Negative    Leukocyte Esterase, Urine, POC Negative Negative       UA - Micro  WBC - 0  RBC - 0  Bacteria - 0  Epith - 0    There were no vitals taken for this visit.      GENERAL: No acute distress, Awake, Alert,

## 2023-10-17 LAB
CYTOLOGY-NON GYN: NORMAL
SPECIMEN SOURCE: NORMAL

## 2023-10-21 NOTE — RESULT ENCOUNTER NOTE
Mr. Murray Horn, your urine cytology showed no cancer cells. This is great news! Please keep your follow up with me next year as scheduled.      Thien Silva,  Dr. Alen Johnson

## 2024-03-04 ENCOUNTER — TELEPHONE (OUTPATIENT)
Dept: UROLOGY | Age: 74
End: 2024-03-04

## 2024-03-04 DIAGNOSIS — N52.01 ERECTILE DYSFUNCTION DUE TO ARTERIAL INSUFFICIENCY: ICD-10-CM

## 2024-03-04 RX ORDER — SILDENAFIL CITRATE 20 MG/1
20 TABLET ORAL DAILY PRN
Qty: 50 TABLET | Refills: 3 | Status: SHIPPED | OUTPATIENT
Start: 2024-03-04

## 2024-03-04 NOTE — TELEPHONE ENCOUNTER
----- Message from Gregory Paiz sent at 3/4/2024 11:53 AM EST -----  Regarding: Viagra prescription   Contact: [phone]  Good morning,  I would like to request a prescription for Viagra. My last prescription does not have a refill.  Thank you and best regards,  Gregory Paiz

## 2024-10-16 ENCOUNTER — LAB (OUTPATIENT)
Dept: UROLOGY | Age: 74
End: 2024-10-16

## 2024-10-16 DIAGNOSIS — N13.8 BPH WITH OBSTRUCTION/LOWER URINARY TRACT SYMPTOMS: ICD-10-CM

## 2024-10-16 DIAGNOSIS — N40.1 BPH WITH OBSTRUCTION/LOWER URINARY TRACT SYMPTOMS: ICD-10-CM

## 2024-10-16 LAB — PSA SERPL-MCNC: 0.6 NG/ML (ref 0–4)

## 2024-10-21 ENCOUNTER — PROCEDURE VISIT (OUTPATIENT)
Dept: UROLOGY | Age: 74
End: 2024-10-21
Payer: MEDICARE

## 2024-10-21 ENCOUNTER — TELEPHONE (OUTPATIENT)
Dept: UROLOGY | Age: 74
End: 2024-10-21

## 2024-10-21 DIAGNOSIS — C67.2 MALIGNANT NEOPLASM OF LATERAL WALL OF BLADDER (HCC): Primary | ICD-10-CM

## 2024-10-21 LAB
BILIRUBIN, URINE, POC: NEGATIVE
BLOOD URINE, POC: NEGATIVE
GLUCOSE URINE, POC: NEGATIVE MG/DL
KETONES, URINE, POC: NEGATIVE MG/DL
LEUKOCYTE ESTERASE, URINE, POC: NEGATIVE
NITRITE, URINE, POC: NEGATIVE
PH, URINE, POC: 6 (ref 4.6–8)
PROTEIN,URINE, POC: NEGATIVE MG/DL
SPECIFIC GRAVITY, URINE, POC: 1 (ref 1–1.03)
URINALYSIS CLARITY, POC: NORMAL
URINALYSIS COLOR, POC: NORMAL
UROBILINOGEN, POC: NORMAL MG/DL

## 2024-10-21 PROCEDURE — 1036F TOBACCO NON-USER: CPT | Performed by: UROLOGY

## 2024-10-21 PROCEDURE — 1123F ACP DISCUSS/DSCN MKR DOCD: CPT | Performed by: UROLOGY

## 2024-10-21 PROCEDURE — G8421 BMI NOT CALCULATED: HCPCS | Performed by: UROLOGY

## 2024-10-21 PROCEDURE — 3017F COLORECTAL CA SCREEN DOC REV: CPT | Performed by: UROLOGY

## 2024-10-21 PROCEDURE — 81003 URINALYSIS AUTO W/O SCOPE: CPT | Performed by: UROLOGY

## 2024-10-21 PROCEDURE — 52000 CYSTOURETHROSCOPY: CPT | Performed by: UROLOGY

## 2024-10-21 PROCEDURE — G8427 DOCREV CUR MEDS BY ELIG CLIN: HCPCS | Performed by: UROLOGY

## 2024-10-21 PROCEDURE — 99214 OFFICE O/P EST MOD 30 MIN: CPT | Performed by: UROLOGY

## 2024-10-21 PROCEDURE — G8484 FLU IMMUNIZE NO ADMIN: HCPCS | Performed by: UROLOGY

## 2024-10-21 NOTE — TELEPHONE ENCOUNTER
----- Message from Dr. Thony Hendricsk MD sent at 10/21/2024  9:50 AM EDT -----  Regarding: Surgery Scheduling  Hospital: [facility]    Surgeon: Ruthie    Assist: NONE    Diagnosis: Bladder Cancer    Procedure: Cystoscopy, monopolar TURBT, instillation of intravesical chemotherapy    Posting time: 1 hour    Special Instruments Needed:  NONE    Anesthesia: GENERAL    Labs: CBC, BMP, U/A    Tests: EKG per anesthesia    Blood: NONE    Bowel Prep: NONE    Special Instructions: need tube for anesthesia    Orders/HandP:     Follow up appointment: will call with path

## 2024-10-21 NOTE — PROGRESS NOTES
[facility]  [address]   [address]  [place] [address]  [phone]    Gregory Paiz  : 1950         HPI   74 y.o., male presents for cystoscopy for bladder cancer surveillance.     He previously followed with Dr. Martini and has a history of TURBT in 3/2020 for bladder cancer (low grade Ta, intermediate risk based on size).  He then had a TURP for BPH/LUTS in 2020 with benign pathology and has since been voiding well.     Today, he has no complaints of gross hematuria, LUTS, concern for recurrent tumor.      CT hematuria protocol in 2021 showed a 3 mm R lower pole kidney stone that is asymptomatic.      Lab Results   Component Value Date    PSA 0.6 10/16/2024    PSA 0.5 10/09/2023    PSA 0.8 10/10/2022     Past Medical History:   Diagnosis Date    Arthritis     feet and hands    Cancer (HCC)     bladder    Enlarged prostate     GERD (gastroesophageal reflux disease)     controlled with nexium    Hypercholesterolemia     Hypertension     medication    Kidney stone     Right inguinal hernia      Past Surgical History:   Procedure Laterality Date    CERVICAL LAMINECTOMY      COLONOSCOPY      HERNIA REPAIR Right 2019    inguinal     TONSILLECTOMY      x2    UPPER GASTROINTESTINAL ENDOSCOPY      UROLOGICAL SURGERY      bladder tumor removal     Current Outpatient Medications   Medication Sig Dispense Refill    sildenafil (REVATIO) 20 MG tablet Take 1 tablet by mouth daily as needed (erectile dysfunction) Increase by 1 tablet as needed up to max of 5 at one time. 50 tablet 3    losartan (COZAAR) 25 MG tablet Take 1 tablet by mouth daily      levocetirizine (XYZAL) 5 MG tablet daily      ondansetron (ZOFRAN-ODT) 4 MG disintegrating tablet Take 1 tablet by mouth every 8 hours as needed      promethazine (PHENERGAN) 12.5 MG tablet Take 1 tablet by mouth every 6 hours as needed      rosuvastatin (CRESTOR) 40 MG tablet Take 1 tablet by mouth      sodium bicarbonate 650 MG tablet TK

## 2024-10-22 LAB
CYTOLOGY-NON GYN: NORMAL
SPECIMEN SOURCE: NORMAL

## 2024-10-30 NOTE — TELEPHONE ENCOUNTER
Procedures: Procedure(s) with comments:   CYSTOSCOPY, MONOPOLAR TRANSURETHRAL RESECTION BLADDER TUMOR, INSTILLATION OF INTRAVESICAL CHEMOTHERAPY - NEED TUBE FOR ANESTHESIA   Date: 12/3/2024   Time: 1436   Location: [facility] MAIN OR  [facility]     Scheduled.  Please complete orders.

## 2024-10-31 ENCOUNTER — PREP FOR PROCEDURE (OUTPATIENT)
Dept: UROLOGY | Age: 74
End: 2024-10-31

## 2024-10-31 DIAGNOSIS — D49.4 BLADDER TUMOR: Primary | ICD-10-CM

## 2024-10-31 RX ORDER — SODIUM CHLORIDE 0.9 % (FLUSH) 0.9 %
5-40 SYRINGE (ML) INJECTION PRN
Status: CANCELLED | OUTPATIENT
Start: 2024-10-31

## 2024-10-31 RX ORDER — SODIUM CHLORIDE 9 MG/ML
INJECTION, SOLUTION INTRAVENOUS PRN
Status: CANCELLED | OUTPATIENT
Start: 2024-10-31

## 2024-10-31 RX ORDER — SODIUM CHLORIDE 0.9 % (FLUSH) 0.9 %
5-40 SYRINGE (ML) INJECTION EVERY 12 HOURS SCHEDULED
Status: CANCELLED | OUTPATIENT
Start: 2024-10-31

## 2024-11-01 ENCOUNTER — HOSPITAL ENCOUNTER (OUTPATIENT)
Dept: CT IMAGING | Age: 74
Discharge: HOME | End: 2024-11-04
Attending: UROLOGY
Payer: MEDICARE

## 2024-11-01 DIAGNOSIS — C67.2 MALIGNANT NEOPLASM OF LATERAL WALL OF BLADDER (HCC): ICD-10-CM

## 2024-11-01 LAB — CREAT BLD-MCNC: 0.92 MG/DL (ref 0.8–1.5)

## 2024-11-01 PROCEDURE — 74178 CT ABD&PLV WO CNTR FLWD CNTR: CPT

## 2024-11-01 PROCEDURE — 6360000004 HC RX CONTRAST MEDICATION: Performed by: UROLOGY

## 2024-11-01 PROCEDURE — 82565 ASSAY OF CREATININE: CPT

## 2024-11-01 RX ORDER — IOPAMIDOL 755 MG/ML
100 INJECTION, SOLUTION INTRAVASCULAR
Status: COMPLETED | OUTPATIENT
Start: 2024-11-01 | End: 2024-11-01

## 2024-11-01 RX ADMIN — IOPAMIDOL 100 ML: 755 INJECTION, SOLUTION INTRAVENOUS at 10:41

## 2024-11-07 NOTE — RESULT ENCOUNTER NOTE
Mr. Paiz, your CT scan does not show any concern for bladder cancer spread outside of the bladder or to your upper urinary tract.  You have a small stone in your right kidney that is non-obstructing and nothing to worry about at this time.  If it ever did move, your likely would pass it on your own.     I recommend that we keep your surgery date with me as scheduled.     Best Regards,  Dr. Hendricks

## 2024-11-19 ENCOUNTER — TELEPHONE (OUTPATIENT)
Dept: UROLOGY | Age: 74
End: 2024-11-19

## 2024-11-19 NOTE — TELEPHONE ENCOUNTER
Pt called stated he received a letter stating his Surgery will be on 12/16/24 and pre op appointment is 12/03/24. Pt stated the pre op appointment needs to be rescheduled due to being out of town. Please contact pt regarding pre op appt.

## 2024-12-10 ENCOUNTER — HOSPITAL ENCOUNTER (OUTPATIENT)
Dept: SURGERY | Age: 74
Discharge: HOME | End: 2024-12-13
Payer: MEDICARE

## 2024-12-10 VITALS
SYSTOLIC BLOOD PRESSURE: 121 MMHG | OXYGEN SATURATION: 93 % | RESPIRATION RATE: 16 BRPM | TEMPERATURE: 97.5 F | WEIGHT: 186.8 LBS | DIASTOLIC BLOOD PRESSURE: 80 MMHG | BODY MASS INDEX: 28.31 KG/M2 | HEART RATE: 76 BPM | HEIGHT: 68 IN

## 2024-12-10 DIAGNOSIS — D49.4 BLADDER TUMOR: ICD-10-CM

## 2024-12-10 LAB
ANION GAP SERPL CALC-SCNC: 12 MMOL/L (ref 7–16)
APPEARANCE UR: CLEAR
BILIRUB UR QL: NEGATIVE
BUN SERPL-MCNC: 20 MG/DL (ref 8–23)
CALCIUM SERPL-MCNC: 9.2 MG/DL (ref 8.8–10.2)
CHLORIDE SERPL-SCNC: 107 MMOL/L (ref 98–107)
CO2 SERPL-SCNC: 22 MMOL/L (ref 20–29)
COLOR UR: NORMAL
CREAT SERPL-MCNC: 1.04 MG/DL (ref 0.8–1.3)
ERYTHROCYTE [DISTWIDTH] IN BLOOD BY AUTOMATED COUNT: 11.8 % (ref 11.9–14.6)
GLUCOSE SERPL-MCNC: 106 MG/DL (ref 70–99)
GLUCOSE UR STRIP.AUTO-MCNC: NEGATIVE MG/DL
HCT VFR BLD AUTO: 41.7 % (ref 41.1–50.3)
HGB BLD-MCNC: 14.3 G/DL (ref 13.6–17.2)
HGB UR QL STRIP: NEGATIVE
KETONES UR QL STRIP.AUTO: NEGATIVE MG/DL
LEUKOCYTE ESTERASE UR QL STRIP.AUTO: NEGATIVE
MCH RBC QN AUTO: 30.8 PG (ref 26.1–32.9)
MCHC RBC AUTO-ENTMCNC: 34.3 G/DL (ref 31.4–35)
MCV RBC AUTO: 89.7 FL (ref 82–102)
NITRITE UR QL STRIP.AUTO: NEGATIVE
NRBC # BLD: 0 K/UL (ref 0–0.2)
PH UR STRIP: 5.5 (ref 5–9)
PLATELET # BLD AUTO: 192 K/UL (ref 150–450)
PMV BLD AUTO: 9.5 FL (ref 9.4–12.3)
POTASSIUM SERPL-SCNC: 4 MMOL/L (ref 3.5–5.1)
PROT UR STRIP-MCNC: NEGATIVE MG/DL
RBC # BLD AUTO: 4.65 M/UL (ref 4.23–5.6)
SODIUM SERPL-SCNC: 141 MMOL/L (ref 136–145)
SP GR UR REFRACTOMETRY: 1 (ref 1–1.02)
UROBILINOGEN UR QL STRIP.AUTO: 0.2 EU/DL (ref 0.2–1)
WBC # BLD AUTO: 5.3 K/UL (ref 4.3–11.1)

## 2024-12-10 PROCEDURE — 87086 URINE CULTURE/COLONY COUNT: CPT

## 2024-12-10 PROCEDURE — 81003 URINALYSIS AUTO W/O SCOPE: CPT

## 2024-12-10 PROCEDURE — 80048 BASIC METABOLIC PNL TOTAL CA: CPT

## 2024-12-10 PROCEDURE — 85027 COMPLETE CBC AUTOMATED: CPT

## 2024-12-10 RX ORDER — M-VIT,TX,IRON,MINS/CALC/FOLIC 27MG-0.4MG
1 TABLET ORAL DAILY
COMMUNITY

## 2024-12-10 NOTE — PERIOP NOTE
Patient verified name and     Order for consent was found in EHR and does match case posting; patient verified.     Type 1B surgery, Walk in assessment complete.    Labs per surgeon: UC, UA, CBC, BMP; results pending  Labs per anesthesia protocol: none  EKG: not required per anesthesia protocol        Patient provided with and instructed on educational handouts including Guide to Surgery, Preventing Surgical Site Infections, Pain Management, and [facility] Brochure.    Patient answered medical/surgical history questions at their best of ability. All prior to admission medications documented in EPIC. Original medication prescription bottle was not visualized during patient appointment.     Patient instructed to hold all vitamins 7 days prior to surgery and NSAIDS 5 days prior to surgery, patient verbalized understanding.     Patient teach back successful and patient demonstrates knowledge of instructions.    PLEASE CONTINUE TAKING ALL PRESCRIPTION MEDICATIONS UP TO THE DAY OF SURGERY UNLESS OTHERWISE DIRECTED BELOW. You may take Tylenol, allergy,  and/or indigestion medications.     TAKE ONLY THESE MEDICATIONS ON THE DAY OF SURGERY   Xyzal if needed for allergies           DISCONTINUE all vitamins and supplements 7 days prior to surgery. DISCONTINUE Non-Steroidal Anti-Inflammatory (NSAIDS) such as Advil and Aleve 5 days prior to surgery.     Home Medications to Hold- please continue all other medications except these.            Comments   Please drink 32 ounces of non-caffeinated clear liquids 2 hours prior to your arrival to avoid dehydration.    Example: 3 (12oz) bottles of water or 1 Large Gatorade any flavor.    On the day before surgery please take 2 Tylenol in the morning and then again before bed. You may use either regular or extra strength.           Please do not bring home medications with you on the day of surgery unless otherwise directed by your nurse.  If you are instructed to bring home

## 2024-12-12 LAB
BACTERIA SPEC CULT: NORMAL
SERVICE CMNT-IMP: NORMAL

## 2024-12-16 ENCOUNTER — ANESTHESIA EVENT (OUTPATIENT)
Dept: SURGERY | Age: 74
End: 2024-12-16
Payer: MEDICARE

## 2024-12-17 ENCOUNTER — ANESTHESIA (OUTPATIENT)
Dept: SURGERY | Age: 74
End: 2024-12-17
Payer: MEDICARE

## 2024-12-17 ENCOUNTER — HOSPITAL ENCOUNTER (OUTPATIENT)
Age: 74
Setting detail: OUTPATIENT SURGERY
Discharge: HOME | End: 2024-12-17
Attending: UROLOGY | Admitting: UROLOGY
Payer: MEDICARE

## 2024-12-17 VITALS
HEIGHT: 68 IN | HEART RATE: 75 BPM | BODY MASS INDEX: 28.49 KG/M2 | WEIGHT: 188 LBS | OXYGEN SATURATION: 94 % | DIASTOLIC BLOOD PRESSURE: 73 MMHG | SYSTOLIC BLOOD PRESSURE: 129 MMHG | TEMPERATURE: 97.2 F | RESPIRATION RATE: 16 BRPM

## 2024-12-17 DIAGNOSIS — C67.2 MALIGNANT NEOPLASM OF LATERAL WALL OF URINARY BLADDER (HCC): ICD-10-CM

## 2024-12-17 DIAGNOSIS — R39.14 FEELING OF INCOMPLETE BLADDER EMPTYING: ICD-10-CM

## 2024-12-17 DIAGNOSIS — N40.0 BPH WITHOUT OBSTRUCTION/LOWER URINARY TRACT SYMPTOMS: Primary | ICD-10-CM

## 2024-12-17 LAB
APPEARANCE UR: CLEAR
BILIRUB UR QL: NEGATIVE
COLOR UR: NORMAL
GLUCOSE UR STRIP.AUTO-MCNC: NEGATIVE MG/DL
HGB UR QL STRIP: NEGATIVE
KETONES UR QL STRIP.AUTO: NEGATIVE MG/DL
LEUKOCYTE ESTERASE UR QL STRIP.AUTO: NEGATIVE
NITRITE UR QL STRIP.AUTO: NEGATIVE
PH UR STRIP: 6 (ref 5–9)
PROT UR STRIP-MCNC: NEGATIVE MG/DL
SP GR UR REFRACTOMETRY: <1.005 (ref 1–1.02)
UROBILINOGEN UR QL STRIP.AUTO: 0.2 EU/DL (ref 0.2–1)

## 2024-12-17 PROCEDURE — 7100000011 HC PHASE II RECOVERY - ADDTL 15 MIN: Performed by: UROLOGY

## 2024-12-17 PROCEDURE — 7100000001 HC PACU RECOVERY - ADDTL 15 MIN: Performed by: UROLOGY

## 2024-12-17 PROCEDURE — 3600000014 HC SURGERY LEVEL 4 ADDTL 15MIN: Performed by: UROLOGY

## 2024-12-17 PROCEDURE — 81003 URINALYSIS AUTO W/O SCOPE: CPT

## 2024-12-17 PROCEDURE — 2580000003 HC RX 258: Performed by: STUDENT IN AN ORGANIZED HEALTH CARE EDUCATION/TRAINING PROGRAM

## 2024-12-17 PROCEDURE — 6360000002 HC RX W HCPCS: Performed by: STUDENT IN AN ORGANIZED HEALTH CARE EDUCATION/TRAINING PROGRAM

## 2024-12-17 PROCEDURE — 2580000003 HC RX 258: Performed by: UROLOGY

## 2024-12-17 PROCEDURE — 6370000000 HC RX 637 (ALT 250 FOR IP): Performed by: ANESTHESIOLOGY

## 2024-12-17 PROCEDURE — 3700000000 HC ANESTHESIA ATTENDED CARE: Performed by: UROLOGY

## 2024-12-17 PROCEDURE — 2709999900 HC NON-CHARGEABLE SUPPLY: Performed by: UROLOGY

## 2024-12-17 PROCEDURE — 88307 TISSUE EXAM BY PATHOLOGIST: CPT

## 2024-12-17 PROCEDURE — 52234 CYSTOSCOPY AND TREATMENT: CPT | Performed by: UROLOGY

## 2024-12-17 PROCEDURE — 2720000010 HC SURG SUPPLY STERILE: Performed by: UROLOGY

## 2024-12-17 PROCEDURE — 7100000010 HC PHASE II RECOVERY - FIRST 15 MIN: Performed by: UROLOGY

## 2024-12-17 PROCEDURE — 6360000002 HC RX W HCPCS: Performed by: UROLOGY

## 2024-12-17 PROCEDURE — 3600000004 HC SURGERY LEVEL 4 BASE: Performed by: UROLOGY

## 2024-12-17 PROCEDURE — 7100000000 HC PACU RECOVERY - FIRST 15 MIN: Performed by: UROLOGY

## 2024-12-17 PROCEDURE — 3700000001 HC ADD 15 MINUTES (ANESTHESIA): Performed by: UROLOGY

## 2024-12-17 PROCEDURE — C1769 GUIDE WIRE: HCPCS | Performed by: UROLOGY

## 2024-12-17 RX ORDER — SODIUM CHLORIDE 9 MG/ML
INJECTION, SOLUTION INTRAVENOUS PRN
Status: DISCONTINUED | OUTPATIENT
Start: 2024-12-17 | End: 2024-12-17 | Stop reason: HOSPADM

## 2024-12-17 RX ORDER — SCOLOPAMINE TRANSDERMAL SYSTEM 1 MG/1
1 PATCH, EXTENDED RELEASE TRANSDERMAL
Status: DISCONTINUED | OUTPATIENT
Start: 2024-12-17 | End: 2024-12-17 | Stop reason: HOSPADM

## 2024-12-17 RX ORDER — OXYCODONE HYDROCHLORIDE 5 MG/1
5 TABLET ORAL
Status: DISCONTINUED | OUTPATIENT
Start: 2024-12-17 | End: 2024-12-17 | Stop reason: HOSPADM

## 2024-12-17 RX ORDER — FENTANYL CITRATE 50 UG/ML
100 INJECTION, SOLUTION INTRAMUSCULAR; INTRAVENOUS
Status: DISCONTINUED | OUTPATIENT
Start: 2024-12-17 | End: 2024-12-17 | Stop reason: HOSPADM

## 2024-12-17 RX ORDER — ONDANSETRON 2 MG/ML
4 INJECTION INTRAMUSCULAR; INTRAVENOUS
Status: DISCONTINUED | OUTPATIENT
Start: 2024-12-17 | End: 2024-12-17 | Stop reason: HOSPADM

## 2024-12-17 RX ORDER — SODIUM CHLORIDE 0.9 % (FLUSH) 0.9 %
5-40 SYRINGE (ML) INJECTION EVERY 12 HOURS SCHEDULED
Status: DISCONTINUED | OUTPATIENT
Start: 2024-12-17 | End: 2024-12-17 | Stop reason: HOSPADM

## 2024-12-17 RX ORDER — SODIUM CHLORIDE, SODIUM LACTATE, POTASSIUM CHLORIDE, CALCIUM CHLORIDE 600; 310; 30; 20 MG/100ML; MG/100ML; MG/100ML; MG/100ML
INJECTION, SOLUTION INTRAVENOUS CONTINUOUS
Status: DISCONTINUED | OUTPATIENT
Start: 2024-12-17 | End: 2024-12-17 | Stop reason: HOSPADM

## 2024-12-17 RX ORDER — DIPHENHYDRAMINE HYDROCHLORIDE 50 MG/ML
12.5 INJECTION INTRAMUSCULAR; INTRAVENOUS
Status: DISCONTINUED | OUTPATIENT
Start: 2024-12-17 | End: 2024-12-17 | Stop reason: HOSPADM

## 2024-12-17 RX ORDER — HYDROMORPHONE HYDROCHLORIDE 2 MG/ML
0.5 INJECTION, SOLUTION INTRAMUSCULAR; INTRAVENOUS; SUBCUTANEOUS EVERY 10 MIN PRN
Status: DISCONTINUED | OUTPATIENT
Start: 2024-12-17 | End: 2024-12-17 | Stop reason: HOSPADM

## 2024-12-17 RX ORDER — SODIUM CHLORIDE 0.9 % (FLUSH) 0.9 %
SYRINGE (ML) INJECTION
Status: DISCONTINUED | OUTPATIENT
Start: 2024-12-17 | End: 2024-12-17 | Stop reason: SDUPTHER

## 2024-12-17 RX ORDER — METOCLOPRAMIDE HYDROCHLORIDE 5 MG/ML
10 INJECTION INTRAMUSCULAR; INTRAVENOUS
Status: DISCONTINUED | OUTPATIENT
Start: 2024-12-17 | End: 2024-12-17 | Stop reason: HOSPADM

## 2024-12-17 RX ORDER — LIDOCAINE HYDROCHLORIDE 20 MG/ML
INJECTION, SOLUTION EPIDURAL; INFILTRATION; INTRACAUDAL; PERINEURAL
Status: DISCONTINUED | OUTPATIENT
Start: 2024-12-17 | End: 2024-12-17 | Stop reason: SDUPTHER

## 2024-12-17 RX ORDER — FENTANYL CITRATE 50 UG/ML
INJECTION, SOLUTION INTRAMUSCULAR; INTRAVENOUS
Status: DISCONTINUED | OUTPATIENT
Start: 2024-12-17 | End: 2024-12-17 | Stop reason: SDUPTHER

## 2024-12-17 RX ORDER — PROPOFOL 10 MG/ML
INJECTION, EMULSION INTRAVENOUS
Status: DISCONTINUED | OUTPATIENT
Start: 2024-12-17 | End: 2024-12-17 | Stop reason: SDUPTHER

## 2024-12-17 RX ORDER — OXYCODONE AND ACETAMINOPHEN 5; 325 MG/1; MG/1
1 TABLET ORAL EVERY 6 HOURS PRN
Qty: 20 TABLET | Refills: 0 | Status: SHIPPED | OUTPATIENT
Start: 2024-12-17 | End: 2024-12-22

## 2024-12-17 RX ORDER — MIDAZOLAM HYDROCHLORIDE 2 MG/2ML
2 INJECTION, SOLUTION INTRAMUSCULAR; INTRAVENOUS
Status: DISCONTINUED | OUTPATIENT
Start: 2024-12-17 | End: 2024-12-17 | Stop reason: HOSPADM

## 2024-12-17 RX ORDER — ONDANSETRON 2 MG/ML
INJECTION INTRAMUSCULAR; INTRAVENOUS
Status: DISCONTINUED | OUTPATIENT
Start: 2024-12-17 | End: 2024-12-17 | Stop reason: SDUPTHER

## 2024-12-17 RX ORDER — NALOXONE HYDROCHLORIDE 0.4 MG/ML
INJECTION, SOLUTION INTRAMUSCULAR; INTRAVENOUS; SUBCUTANEOUS PRN
Status: DISCONTINUED | OUTPATIENT
Start: 2024-12-17 | End: 2024-12-17 | Stop reason: HOSPADM

## 2024-12-17 RX ORDER — SODIUM CHLORIDE 0.9 % (FLUSH) 0.9 %
5-40 SYRINGE (ML) INJECTION PRN
Status: DISCONTINUED | OUTPATIENT
Start: 2024-12-17 | End: 2024-12-17 | Stop reason: HOSPADM

## 2024-12-17 RX ORDER — CEPHALEXIN 500 MG/1
500 CAPSULE ORAL 2 TIMES DAILY
Qty: 6 CAPSULE | Refills: 0 | Status: SHIPPED | OUTPATIENT
Start: 2024-12-17 | End: 2024-12-20

## 2024-12-17 RX ORDER — DEXAMETHASONE SODIUM PHOSPHATE 4 MG/ML
INJECTION, SOLUTION INTRA-ARTICULAR; INTRALESIONAL; INTRAMUSCULAR; INTRAVENOUS; SOFT TISSUE
Status: DISCONTINUED | OUTPATIENT
Start: 2024-12-17 | End: 2024-12-17 | Stop reason: SDUPTHER

## 2024-12-17 RX ORDER — FENTANYL CITRATE 50 UG/ML
25 INJECTION, SOLUTION INTRAMUSCULAR; INTRAVENOUS EVERY 5 MIN PRN
Status: DISCONTINUED | OUTPATIENT
Start: 2024-12-17 | End: 2024-12-17 | Stop reason: HOSPADM

## 2024-12-17 RX ADMIN — SODIUM CHLORIDE, PRESERVATIVE FREE 5 ML: 5 INJECTION INTRAVENOUS at 12:51

## 2024-12-17 RX ADMIN — SODIUM CHLORIDE, PRESERVATIVE FREE 10 ML: 5 INJECTION INTRAVENOUS at 12:46

## 2024-12-17 RX ADMIN — ONDANSETRON 4 MG: 2 INJECTION INTRAMUSCULAR; INTRAVENOUS at 12:39

## 2024-12-17 RX ADMIN — FENTANYL CITRATE 50 MCG: 50 INJECTION, SOLUTION INTRAMUSCULAR; INTRAVENOUS at 12:38

## 2024-12-17 RX ADMIN — SODIUM CHLORIDE, PRESERVATIVE FREE 30 ML: 5 INJECTION INTRAVENOUS at 12:33

## 2024-12-17 RX ADMIN — SODIUM CHLORIDE, PRESERVATIVE FREE 10 ML: 5 INJECTION INTRAVENOUS at 12:44

## 2024-12-17 RX ADMIN — FENTANYL CITRATE 25 MCG: 50 INJECTION, SOLUTION INTRAMUSCULAR; INTRAVENOUS at 12:51

## 2024-12-17 RX ADMIN — PROPOFOL 160 MG: 10 INJECTION, EMULSION INTRAVENOUS at 12:33

## 2024-12-17 RX ADMIN — FENTANYL CITRATE 25 MCG: 50 INJECTION, SOLUTION INTRAMUSCULAR; INTRAVENOUS at 12:46

## 2024-12-17 RX ADMIN — GEMCITABINE HYDROCHLORIDE 2000 MG: 1 INJECTION, SOLUTION INTRAVENOUS at 12:57

## 2024-12-17 RX ADMIN — CEFAZOLIN 2000 MG: 2 INJECTION, POWDER, FOR SOLUTION INTRAMUSCULAR; INTRAVENOUS at 12:41

## 2024-12-17 RX ADMIN — LIDOCAINE HYDROCHLORIDE 100 MG: 20 INJECTION, SOLUTION EPIDURAL; INFILTRATION; INTRACAUDAL; PERINEURAL at 12:33

## 2024-12-17 RX ADMIN — DEXAMETHASONE SODIUM PHOSPHATE 4 MG: 4 INJECTION INTRA-ARTICULAR; INTRALESIONAL; INTRAMUSCULAR; INTRAVENOUS; SOFT TISSUE at 12:39

## 2024-12-17 ASSESSMENT — PAIN - FUNCTIONAL ASSESSMENT: PAIN_FUNCTIONAL_ASSESSMENT: 0-10

## 2024-12-17 NOTE — DISCHARGE INSTRUCTIONS
If you have had surgery in the past 7-10 days by one of our providers and are having fever, bleeding, or drainage from an incision, have an opening in an incision, or having issues urinating properly, please call [phone].    Transurethral Resection for Bladder    You have had a transurethral resection (TUR) of the bladder.  You may have a small tube called a catheter in your urethra to help stop bleeding and to prevent blockage of the urethra. When the bleeding has stopped, the tube is removed.   You may feel the need to urinate frequently for a while after the surgery, but this should improve with time. It may burn when you urinate. Drink lots of fluids to help with the burning. Your urine also may look pink for up to 2 to 3 weeks after surgery. This is because there may be blood in it.  You may have to avoid strenuous activity and heavy lifting for about 3 weeks after TUR.  This care sheet gives you a general idea about how long it will take for you to recover. But each person recovers at a different pace. Follow the steps below to feel better as quickly as possible.  How can you care for yourself at home?  Activity  Rest when you feel tired. Getting enough sleep will help you recover.  Try to walk each day. Start by walking a little more than you did the day before. Bit by bit, increase the amount you walk. Walking boosts blood flow and helps prevent pneumonia and constipation.  Please limit your activities for 3 days.  Avoid strenuous activities, such as bicycle riding, jogging, weight lifting, or aerobic exercise, for about 3 weeks, or until your doctor says it is okay.  For about 3 weeks, avoid lifting anything that would make you strain. This may include heavy grocery bags and milk containers, a heavy briefcase or backpack, cat litter or dog food bags, a vacuum , or a child.  Ask your doctor when you can drive again.  You may shower and take baths when your doctor says it is okay.  Ask your doctor  medications are discontinued, doses are      changed, or new medications (including over-the-counter products) are added.  *  Please carry medication information at all times in case of emergency situations.    These are general instructions for a healthy lifestyle:  No smoking/ No tobacco products/ Avoid exposure to second hand smoke  Surgeon General's Warning:  Quitting smoking now greatly reduces serious risk to your health.  Obesity, smoking, and sedentary lifestyle greatly increases your risk for illness  A healthy diet, regular physical exercise & weight monitoring are important for maintaining a healthy lifestyle    You may be retaining fluid if you have a history of heart failure or if you experience any of the following symptoms:  Weight gain of 3 pounds or more overnight or 5 pounds in a week, increased swelling in our hands or feet or shortness of breath while lying flat in bed.  Please call your doctor as soon as you notice any of these symptoms; do not wait until your next office visit.    If you have had surgery in the past 7-10 days by one of our providers and are having fever, bleeding, or drainage from an incision, have an opening in an incision, or having issues urinating properly, please call [phone] during normal office hours. Please call [phone] for any immediate needs AFTER HOURS.     An indwelling Yanez Catheter is a tube that empties urine from your bladder into a drainage bag.  To prevent blockage of the catheter and contamination of the urine, it is important that you follow a few guidelines in caring for your catheter:    1.  Empty your drainage bag once every 8 hours or as soon as it fills.      2.  Empty the bag by loosening the clamp on the end of the bag (leg or bedside bag).  Do not touch the tip of the tube.  After draining the urine into the toilet, you may clean the tip with a povidone-iodine (Betadine) solution.  Wash hands well before and after emptying drainage

## 2024-12-17 NOTE — H&P
[facility] H&P Note                                           12/17/24     Patient: Gregory Paiz  MRN: [id number]    Admission Date:  12/17/2024, 0  Admission Diagnosis: Malignant neoplasm of lateral wall of urinary bladder (HCC) [C67.2]  Reason for Consult: bladder mass    ASSESSMENT: 74 y.o. male with concern for recurrent bladder cancer on office cysto.  Presents for treatment today.    PLAN:  -To OR for cystoscopy, monopolar trans-urethral resection of bladder tumor, instillation of intra-vesical chemotherapy  -Consented  -Antibiotic on call to OR  -NPO for procedure      __________________________________________________________________________________    HPI:     Gregory Paiz is a 74 y.o. male with a PMH of bladder cancer now with concern for recurrence based on most recent surveillance cysto.  Presents for TURBT today.     Past Medical History:  Past Medical History:   Diagnosis Date    Arthritis     feet and hands    Cancer (HCC)     bladder    Enlarged prostate     GERD (gastroesophageal reflux disease)     controlled with nexium    Hypercholesterolemia     Hypertension     medication    Kidney stone     Right inguinal hernia        Past Surgical History:  Past Surgical History:   Procedure Laterality Date    CERVICAL LAMINECTOMY  2005    COLONOSCOPY      HERNIA REPAIR Right 2019    inguinal     TONSILLECTOMY      x2    UPPER GASTROINTESTINAL ENDOSCOPY      UROLOGICAL SURGERY      bladder tumor removal       Medications:  No current facility-administered medications on file prior to encounter.     Current Outpatient Medications on File Prior to Encounter   Medication Sig Dispense Refill    sildenafil (REVATIO) 20 MG tablet Take 1 tablet by mouth daily as needed (erectile dysfunction) Increase by 1 tablet as needed up to max of 5 at one time. (Patient not taking: Reported on 12/10/2024) 50 tablet 3    losartan (COZAAR) 25 MG tablet Take 1 tablet by mouth

## 2024-12-17 NOTE — ANESTHESIA PRE PROCEDURE
Department of Anesthesiology  Preprocedure Note       Name:  Gregory Paiz   Age:  74 y.o.  :  1950                                          MRN:  [id number]         Date:  2024      Surgeon: Surgeon(s):  Thony Hendricks MD    Procedure: Procedure(s):  CYSTOSCOPY, MONOPOLAR TRANSURETHRAL RESECTION BLADDER TUMOR, INSTILLATION OF INTRAVESICAL CHEMOTHERAPY    Medications prior to admission:   Prior to Admission medications    Medication Sig Start Date End Date Taking? Authorizing Provider   Berberine Chloride 500 MG CAPS Take 500 mg by mouth daily (with breakfast)    Jessika Dotson MD   Multiple Vitamins-Minerals (THERAPEUTIC MULTIVITAMIN-MINERALS) tablet Take 1 tablet by mouth daily    Jessika Dotson MD   sildenafil (REVATIO) 20 MG tablet Take 1 tablet by mouth daily as needed (erectile dysfunction) Increase by 1 tablet as needed up to max of 5 at one time.  Patient not taking: Reported on 12/10/2024 3/4/24   Thony Hendricks MD   losartan (COZAAR) 25 MG tablet Take 1 tablet by mouth daily 21   Jessika Dotson MD   levocetirizine (XYZAL) 5 MG tablet daily 6/18/15   Automatic Reconciliation, Ar   rosuvastatin (CRESTOR) 40 MG tablet Take 1 tablet by mouth 19   Automatic Reconciliation, Ar       Current medications:    Current Facility-Administered Medications   Medication Dose Route Frequency Provider Last Rate Last Admin   • gemcitabine HCl (GEMZAR) 2,000 mg in sterile water 100 mL chemo bladder instillation  2,000 mg Bladder Instillation Once Thony Hendricks MD       • fentaNYL (SUBLIMAZE) injection 100 mcg  100 mcg IntraVENous Once PRN Gregory Cote MD       • scopolamine (TRANSDERM-SCOP) transdermal patch 1 patch  1 patch TransDERmal Q72H Gregory Cote MD       • lactated ringers infusion   IntraVENous Continuous Gregory Cote MD   Held at 24 1125   • sodium chloride flush 0.9 % injection 5-40 mL  5-40 mL IntraVENous 2 times per day Sherbert, Gregory D,

## 2024-12-17 NOTE — BRIEF OP NOTE
Brief Postoperative Note      Patient: Gregory Paiz  YOB: 1950  MRN: [id number]    Date of Procedure: 12/17/2024    Pre-Op Diagnosis Codes:      * Malignant neoplasm of lateral wall of urinary bladder (HCC) [C67.2]    Post-Op Diagnosis: Same       Procedure(s):  CYSTOSCOPY, MONOPOLAR TRANSURETHRAL RESECTION BLADDER TUMOR, INSTILLATION OF INTRAVESICAL CHEMOTHERAPY, Tumor size small < 2 cm     Surgeon(s):  Thony Hendricks MD    Assistant:  * No surgical staff found *    Anesthesia: General    Estimated Blood Loss (mL): Minimal    Complications: None    Specimens:   ID Type Source Tests Collected by Time Destination   A : bladder tumor Tissue Bladder SURGICAL PATHOLOGY Thony Hendricks MD 12/17/2024 1259        Implants:  * No implants in log *      Drains: * No LDAs found *    Findings:  Infection Present At Time Of Surgery (PATOS) (choose all levels that have infection present):  No infection present  Other Findings: Two papillar tumors (1 on R lateral wall and 1 on L lateral wall) suspicious for recurrent bladder cancer.  Visually complete resection.  Open prostate with TURP defect.     Electronically signed by Thony Hendricks MD on 12/17/2024 at 1:26 PM

## 2024-12-17 NOTE — OP NOTE
[address]  [address]                            OPERATIVE REPORT      PATIENT NAME: HELADIO WAITE            : 1950  MED REC NO: [id number]                       [place] [facility] [place]  ACCOUNT NO: [id number]                       ADMIT DATE: 2024  PROVIDER: Thony Hendricks MD    DATE OF SERVICE:  2024    PREOPERATIVE DIAGNOSES:  Bladder cancer.    POSTOPERATIVE DIAGNOSES:  Bladder cancer.    PROCEDURES PERFORMED:  Cystoscopy, monopolar transurethral resection of bladder tumor, instillation of intravesical chemotherapy, tumor size less than 2 cm, small.    SURGEON:  Thony Hendricks MD    ASSISTANT:  None.    ANESTHESIA:  General.    ESTIMATED BLOOD LOSS:  Minimal.    SPECIMENS REMOVED:  Bladder tumor for path.    INTRAOPERATIVE FINDINGS:  No infection.  Two papillary tumors, one on the right lateral wall and one on the left lateral wall, suspicious for recurrent bladder cancer.  Visually complete resection of open prostate with a TURP defect.     COMPLICATIONS:  None.    IMPLANTS:  None.    INDICATIONS:  The patient is a pleasant 74-year-old gentleman with a history of bladder cancer, under surveillance.  He was diagnosed in 2020 on a TURBT.  He also had a TURP in May 2020 for BPH with LUTS.  He did well on his surveillance until recently when office cystoscopy showed a papillary mass that was less than 2 cm on the left lateral wall of the bladder.  He was counseled on options and wanted to proceed with a TURBT today to remove the area of concern.  After risk-benefit discussion was had, he opted to proceed.    DESCRIPTION OF PROCEDURE:  After informed consent was obtained and the correct patient was identified in the preoperative holding area, he was taken back to the operating room suite, placed on the table in supine position.  Time-out was performed confirming correct patient and planned procedure.  He received 2 g of

## 2024-12-17 NOTE — ANESTHESIA POSTPROCEDURE EVALUATION
Department of Anesthesiology  Postprocedure Note    Patient: Gregory Paiz  MRN: [id number]  YOB: 1950  Date of evaluation: 12/17/2024    Procedure Summary       Date: 12/17/24 Room / Location: [facility] MAIN OR  CYSTO / [facility] MAIN OR    Anesthesia Start: 1226 Anesthesia Stop: 1313    Procedure: CYSTOSCOPY, MONOPOLAR TRANSURETHRAL RESECTION BLADDER TUMOR, INSTILLATION OF INTRAVESICAL CHEMOTHERAPY (Urethra) Diagnosis:       Malignant neoplasm of lateral wall of urinary bladder (HCC)      (Malignant neoplasm of lateral wall of urinary bladder (HCC) [C67.2])    Providers: Thony Hendricks MD Responsible Provider: Gregory Cote MD    Anesthesia Type: General ASA Status: 2            Anesthesia Type: General    Jewels Phase I: Jewels Score: 8    Jewels Phase II:      Anesthesia Post Evaluation    Patient location during evaluation: PACU  Patient participation: complete - patient participated  Level of consciousness: awake and awake and alert  Airway patency: patent  Nausea & Vomiting: no nausea  Cardiovascular status: hemodynamically stable  Respiratory status: acceptable  Hydration status: euvolemic  Multimodal analgesia pain management approach  Pain management: adequate    No notable events documented.

## 2025-01-06 ENCOUNTER — TELEPHONE (OUTPATIENT)
Dept: UROLOGY | Age: 75
End: 2025-01-06

## (undated) DEVICE — CATHETER URETH 24FR BLLN 30CC STD LTX 3 W TWO OPP DRNGE EYE

## (undated) DEVICE — TUBING, SUCTION, 1/4" X 10', STRAIGHT: Brand: MEDLINE

## (undated) DEVICE — SOLUTION IRRIG 3000ML 0.9% SOD CHL FLX CONT 0797208] ICU MEDICAL INC]

## (undated) DEVICE — GARMENT,MEDLINE,DVT,INT,CALF,MED, GEN2: Brand: MEDLINE

## (undated) DEVICE — DISCONTINUED FOR BSMH USE 2127302 PLUG CATH F UNIV ENDCAP FOR OCCL DRNGE LUMN DISP

## (undated) DEVICE — BAG,DRAINAGE,4L,A/R TOWER,LL,SLIDE TAP: Brand: MEDLINE

## (undated) DEVICE — 3M™ TEGADERM™ TRANSPARENT FILM DRESSING FRAME STYLE, 1626W, 4 IN X 4-3/4 IN (10 CM X 12 CM), 50/CT 4CT/CASE: Brand: 3M™ TEGADERM™

## (undated) DEVICE — SUTURE PROL SZ 0 L30IN NONABSORBABLE BLU L26MM CT-2 1/2 CIR 8412H

## (undated) DEVICE — Device

## (undated) DEVICE — CATHETER F BLLN 5CC 18FR 2 W HYDRGEL COAT LESS TRAUM LUB

## (undated) DEVICE — SUTURE COAT VCRL SZ 4-0 L18IN ABSRB UD L19MM PS-2 1/2 CIR J496G

## (undated) DEVICE — 2000CC GUARDIAN II: Brand: GUARDIAN

## (undated) DEVICE — STANDARD HYPODERMIC NEEDLE,POLYPROPYLENE HUB: Brand: MONOJECT

## (undated) DEVICE — SHEET, DRAPE, SPLIT, STERILE: Brand: MEDLINE

## (undated) DEVICE — CYSTO/BLADDER IRRIGATION SET, REGULATING CLAMP

## (undated) DEVICE — TRAY PREP DRY W/ PREM GLV 2 APPL 6 SPNG 2 UNDPD 1 OVERWRAP

## (undated) DEVICE — TURP TURB: Brand: MEDLINE INDUSTRIES, INC.

## (undated) DEVICE — SURGICAL PROCEDURE PACK BASIC ST FRANCIS

## (undated) DEVICE — SET,IRRIGATION,CYSTO,Y-TYPE,90": Brand: MEDLINE

## (undated) DEVICE — SUTURE VCRL SZ 2-0 L18IN ABSRB UD POLYGLACTIN 910 BRAID TIE J111T

## (undated) DEVICE — SUTURE VCRL SZ 2-0 L27IN ABSRB UD L26MM SH 1/2 CIR J417H

## (undated) DEVICE — SOLUTION IRRIG 1000ML H2O STRL BLT

## (undated) DEVICE — ELECTRD CUT LP ANG 27FR BRN

## (undated) DEVICE — INTENDED FOR TISSUE SEPARATION, AND OTHER PROCEDURES THAT REQUIRE A SHARP SURGICAL BLADE TO PUNCTURE OR CUT.: Brand: BARD-PARKER SAFETY BLADES SIZE 15, STERILE

## (undated) DEVICE — Device: Brand: OLYMPUS

## (undated) DEVICE — 3M™ IOBAN™ 2 ANTIMICROBIAL INCISE DRAPE 6650EZ: Brand: IOBAN™ 2

## (undated) DEVICE — CYSTO: Brand: MEDLINE INDUSTRIES, INC.

## (undated) DEVICE — ELECTRODE PT RET AD L9FT HI MOIST COND ADH HYDRGEL CORDED

## (undated) DEVICE — APPLICATOR BNDG 1MM ADH PREMIERPRO EXOFIN

## (undated) DEVICE — SOLUTION IV 1000ML 0.9% SOD CHL

## (undated) DEVICE — SYRINGE MED 30ML STD CLR PLAS LUERLOCK TIP N CTRL DISP

## (undated) DEVICE — SYRINGE MED 10ML LUERLOCK TIP W/O SFTY DISP

## (undated) DEVICE — KENDALL SCD EXPRESS SLEEVES, KNEE LENGTH, MEDIUM: Brand: KENDALL SCD

## (undated) DEVICE — BUTTON SWITCH PENCIL BLADE ELECTRODE, HOLSTER: Brand: EDGE

## (undated) DEVICE — SUREFIT, DUAL DISPERSIVE ELECTRODE, CONTACT QUALITY MONITOR: Brand: SUREFIT

## (undated) DEVICE — DEVICE SECUREMENT 1/32IN POLYETH FOAM F ANCHR URIN CATH

## (undated) DEVICE — INTENDED FOR TISSUE SEPARATION, AND OTHER PROCEDURES THAT REQUIRE A SHARP SURGICAL BLADE TO PUNCTURE OR CUT.: Brand: BARD-PARKER SAFETY BLADES SIZE 10, STERILE

## (undated) DEVICE — MEDI-VAC NON-CONDUCTIVE SUCTION TUBING: Brand: CARDINAL HEALTH

## (undated) DEVICE — REM POLYHESIVE ADULT PATIENT RETURN ELECTRODE: Brand: VALLEYLAB

## (undated) DEVICE — AMD ANTIMICROBIAL NON-ADHERENT PAD,0.2% POLYHEXAMETHYLENE BIGUANIDE HCI (PHMB): Brand: TELFA

## (undated) DEVICE — CONTAINER SPEC FRMLN 120ML --

## (undated) DEVICE — PENROSE DRAIN 12" X 1/4: Brand: CARDINAL HEALTH

## (undated) DEVICE — SPONGE: SPECIALTY PEANUT XR 100/CS: Brand: MEDICAL ACTION INDUSTRIES

## (undated) DEVICE — LASER SURG FIBER 1000 MH RND TIP HOLM SMARTSCOPE DISP

## (undated) DEVICE — SUTURE VCRL SZ 3-0 L27IN ABSRB UD L26MM SH 1/2 CIR J416H

## (undated) DEVICE — (D)PREP SKN CHLRAPRP APPL 26ML -- CONVERT TO ITEM 371833

## (undated) DEVICE — SOLUTION IRRIG 3000ML H2O STRL BAG

## (undated) DEVICE — SYR LR LCK 1ML GRAD NSAF 30ML --

## (undated) DEVICE — SOL IRR GLYC 1.5 % 3000ML --

## (undated) DEVICE — Y-TYPE TUR/BLADDER IRRIGATION SET, REGULATING CLAMP

## (undated) DEVICE — ELECTRODE,CUTTING,STERILE.24FR: Brand: N.A.

## (undated) DEVICE — DRAPE,TOP,102X53,STERILE: Brand: MEDLINE

## (undated) DEVICE — PAD,NON-ADHERENT,3X8,STERILE,LF,1/PK: Brand: MEDLINE

## (undated) DEVICE — GUIDEWIRE UROLOGICAL STR 3 CM 0.038 INX150 CM DUAL-FLEX